# Patient Record
Sex: MALE | Race: WHITE | NOT HISPANIC OR LATINO | Employment: OTHER | URBAN - METROPOLITAN AREA
[De-identification: names, ages, dates, MRNs, and addresses within clinical notes are randomized per-mention and may not be internally consistent; named-entity substitution may affect disease eponyms.]

---

## 2023-02-22 LAB
ALANINE AMINOTRANSFERASE (SGPT) (U/L) IN SER/PLAS: 97 U/L (ref 10–52)
ALBUMIN (G/DL) IN SER/PLAS: 4.1 G/DL (ref 3.4–5)
ALKALINE PHOSPHATASE (U/L) IN SER/PLAS: 95 U/L (ref 33–120)
ASPARTATE AMINOTRANSFERASE (SGOT) (U/L) IN SER/PLAS: 43 U/L (ref 9–39)
BASOPHILS (10*3/UL) IN BLOOD BY AUTOMATED COUNT: 0.05 X10E9/L (ref 0–0.1)
BASOPHILS/100 LEUKOCYTES IN BLOOD BY AUTOMATED COUNT: 0.5 % (ref 0–2)
BILIRUBIN DIRECT (MG/DL) IN SER/PLAS: 0.1 MG/DL (ref 0–0.3)
BILIRUBIN TOTAL (MG/DL) IN SER/PLAS: 0.6 MG/DL (ref 0–1.2)
EOSINOPHILS (10*3/UL) IN BLOOD BY AUTOMATED COUNT: 0.45 X10E9/L (ref 0–0.7)
EOSINOPHILS/100 LEUKOCYTES IN BLOOD BY AUTOMATED COUNT: 4.6 % (ref 0–6)
ERYTHROCYTE DISTRIBUTION WIDTH (RATIO) BY AUTOMATED COUNT: 12.2 % (ref 11.5–14.5)
ERYTHROCYTE MEAN CORPUSCULAR HEMOGLOBIN CONCENTRATION (G/DL) BY AUTOMATED: 32.1 G/DL (ref 32–36)
ERYTHROCYTE MEAN CORPUSCULAR VOLUME (FL) BY AUTOMATED COUNT: 88 FL (ref 80–100)
ERYTHROCYTES (10*6/UL) IN BLOOD BY AUTOMATED COUNT: 5.44 X10E12/L (ref 4.5–5.9)
HEMATOCRIT (%) IN BLOOD BY AUTOMATED COUNT: 47.7 % (ref 41–52)
HEMOGLOBIN (G/DL) IN BLOOD: 15.3 G/DL (ref 13.5–17.5)
IMMATURE GRANULOCYTES/100 LEUKOCYTES IN BLOOD BY AUTOMATED COUNT: 0.3 % (ref 0–0.9)
LEUKOCYTES (10*3/UL) IN BLOOD BY AUTOMATED COUNT: 9.8 X10E9/L (ref 4.4–11.3)
LYMPHOCYTES (10*3/UL) IN BLOOD BY AUTOMATED COUNT: 3.26 X10E9/L (ref 1.2–4.8)
LYMPHOCYTES/100 LEUKOCYTES IN BLOOD BY AUTOMATED COUNT: 33.2 % (ref 13–44)
MONOCYTES (10*3/UL) IN BLOOD BY AUTOMATED COUNT: 1.06 X10E9/L (ref 0.1–1)
MONOCYTES/100 LEUKOCYTES IN BLOOD BY AUTOMATED COUNT: 10.8 % (ref 2–10)
NEUTROPHILS (10*3/UL) IN BLOOD BY AUTOMATED COUNT: 4.98 X10E9/L (ref 1.2–7.7)
NEUTROPHILS/100 LEUKOCYTES IN BLOOD BY AUTOMATED COUNT: 50.6 % (ref 40–80)
NRBC (PER 100 WBCS) BY AUTOMATED COUNT: 0 /100 WBC (ref 0–0)
PLATELETS (10*3/UL) IN BLOOD AUTOMATED COUNT: 288 X10E9/L (ref 150–450)
PROTEIN TOTAL: 7.1 G/DL (ref 6.4–8.2)

## 2023-02-27 LAB
6-ACETYLMORPHINE: <25 NG/ML
7-AMINOCLONAZEPAM: 78 NG/ML
ALPHA-HYDROXYALPRAZOLAM: <25 NG/ML
ALPHA-HYDROXYMIDAZOLAM: <25 NG/ML
ALPRAZOLAM: <25 NG/ML
AMPHETAMINE (PRESENCE) IN URINE BY SCREEN METHOD: ABNORMAL
BARBITURATES PRESENCE IN URINE BY SCREEN METHOD: ABNORMAL
CANNABINOIDS IN URINE BY SCREEN METHOD: ABNORMAL
CHLORDIAZEPOXIDE: <25 NG/ML
CLONAZEPAM: <25 NG/ML
COCAINE (PRESENCE) IN URINE BY SCREEN METHOD: ABNORMAL
CODEINE: <50 NG/ML
CREATINE, URINE FOR DRUG: 136.2 MG/DL
DIAZEPAM: <25 NG/ML
DRUG SCREEN COMMENT URINE: ABNORMAL
EDDP: <25 NG/ML
FENTANYL CONFIRMATION, URINE: <2.5 NG/ML
HYDROCODONE: <25 NG/ML
HYDROMORPHONE: <25 NG/ML
LORAZEPAM: <25 NG/ML
METHADONE CONFIRMATION,URINE: <25 NG/ML
MIDAZOLAM: <25 NG/ML
MORPHINE URINE: <50 NG/ML
NORDIAZEPAM: <25 NG/ML
NORFENTANYL: <2.5 NG/ML
NORHYDROCODONE: <25 NG/ML
NOROXYCODONE: <25 NG/ML
O-DESMETHYLTRAMADOL: <50 NG/ML
OXAZEPAM: <25 NG/ML
OXYCODONE: <25 NG/ML
OXYMORPHONE: <25 NG/ML
PHENCYCLIDINE (PRESENCE) IN URINE BY SCREEN METHOD: ABNORMAL
TEMAZEPAM: <25 NG/ML
TRAMADOL: <50 NG/ML
ZOLPIDEM METABOLITE (ZCA): <25 NG/ML
ZOLPIDEM: <25 NG/ML

## 2023-04-26 ENCOUNTER — OFFICE VISIT (OUTPATIENT)
Dept: PRIMARY CARE | Facility: CLINIC | Age: 23
End: 2023-04-26
Payer: COMMERCIAL

## 2023-04-26 VITALS — SYSTOLIC BLOOD PRESSURE: 122 MMHG | WEIGHT: 315 LBS | BODY MASS INDEX: 49.23 KG/M2 | DIASTOLIC BLOOD PRESSURE: 88 MMHG

## 2023-04-26 DIAGNOSIS — L01.02 FOLLICULITIS AND PERIFOLLICULITIS: ICD-10-CM

## 2023-04-26 DIAGNOSIS — Z00.00 HEALTHCARE MAINTENANCE: Primary | ICD-10-CM

## 2023-04-26 DIAGNOSIS — L73.9 FOLLICULITIS AND PERIFOLLICULITIS: ICD-10-CM

## 2023-04-26 PROBLEM — G40.309 GENERALIZED EPILEPSY (MULTI): Status: ACTIVE | Noted: 2023-04-26

## 2023-04-26 PROBLEM — F91.9 DISRUPTIVE BEHAVIOR DISORDER: Status: ACTIVE | Noted: 2023-04-26

## 2023-04-26 PROBLEM — F63.9 IMPULSE CONTROL DISORDER: Status: ACTIVE | Noted: 2023-04-26

## 2023-04-26 PROBLEM — M40.209 KYPHOSIS: Status: ACTIVE | Noted: 2023-04-26

## 2023-04-26 PROBLEM — R51.9 HEADACHE: Status: ACTIVE | Noted: 2023-04-26

## 2023-04-26 PROBLEM — F84.0 AUTISM DISORDER (HHS-HCC): Status: ACTIVE | Noted: 2023-04-26

## 2023-04-26 PROBLEM — M41.9 SCOLIOSIS: Status: ACTIVE | Noted: 2023-04-26

## 2023-04-26 PROBLEM — L21.9 SEBORRHEIC DERMATITIS: Status: ACTIVE | Noted: 2023-04-26

## 2023-04-26 PROBLEM — R74.01 ELEVATED ALT MEASUREMENT: Status: ACTIVE | Noted: 2023-04-26

## 2023-04-26 PROBLEM — M54.6 PAIN IN THORACIC SPINE: Status: ACTIVE | Noted: 2023-04-26

## 2023-04-26 PROBLEM — M25.511 RIGHT SHOULDER PAIN: Status: ACTIVE | Noted: 2023-04-26

## 2023-04-26 PROCEDURE — 99214 OFFICE O/P EST MOD 30 MIN: CPT | Performed by: INTERNAL MEDICINE

## 2023-04-26 PROCEDURE — 1036F TOBACCO NON-USER: CPT | Performed by: INTERNAL MEDICINE

## 2023-04-26 RX ORDER — ZIPRASIDONE HYDROCHLORIDE 60 MG/1
CAPSULE ORAL
COMMUNITY
Start: 2023-04-24 | End: 2023-10-11 | Stop reason: SDUPTHER

## 2023-04-26 RX ORDER — CLONIDINE HYDROCHLORIDE 0.2 MG/1
TABLET ORAL
COMMUNITY
End: 2023-10-11 | Stop reason: SDUPTHER

## 2023-04-26 RX ORDER — CLONAZEPAM 2 MG/1
TABLET, ORALLY DISINTEGRATING ORAL
COMMUNITY
Start: 2017-01-06

## 2023-04-26 RX ORDER — RISPERIDONE 2 MG/1
1 TABLET ORAL 2 TIMES DAILY
COMMUNITY
Start: 2017-08-30 | End: 2023-10-11 | Stop reason: WASHOUT

## 2023-04-26 RX ORDER — FLUTICASONE PROPIONATE 50 MCG
2 SPRAY, SUSPENSION (ML) NASAL DAILY
COMMUNITY

## 2023-04-26 RX ORDER — HYDROXYZINE HYDROCHLORIDE 25 MG/1
TABLET, FILM COATED ORAL
COMMUNITY
Start: 2019-06-14 | End: 2023-10-11 | Stop reason: SDUPTHER

## 2023-04-26 RX ORDER — PROPRANOLOL HYDROCHLORIDE 60 MG/1
TABLET ORAL
COMMUNITY
Start: 2022-05-18 | End: 2023-10-11 | Stop reason: SDUPTHER

## 2023-04-26 RX ORDER — MULTIVITAMIN
1 TABLET ORAL DAILY
COMMUNITY

## 2023-04-26 RX ORDER — CLONAZEPAM 1 MG/1
TABLET ORAL
COMMUNITY
End: 2023-10-11 | Stop reason: SDUPTHER

## 2023-04-26 RX ORDER — KETOCONAZOLE 20 MG/G
CREAM TOPICAL 2 TIMES DAILY
COMMUNITY
Start: 2022-04-27 | End: 2023-10-25 | Stop reason: SDUPTHER

## 2023-04-26 RX ORDER — LAMOTRIGINE 25 MG/1
TABLET, CHEWABLE ORAL
COMMUNITY
End: 2023-12-06

## 2023-04-26 RX ORDER — CLONIDINE HYDROCHLORIDE 0.1 MG/1
TABLET ORAL
COMMUNITY
Start: 2022-07-06 | End: 2023-10-11 | Stop reason: SDUPTHER

## 2023-04-26 RX ORDER — PROPRANOLOL HYDROCHLORIDE 40 MG/1
TABLET ORAL
COMMUNITY
Start: 2022-06-19 | End: 2023-10-11 | Stop reason: SDUPTHER

## 2023-04-26 RX ORDER — CHLORHEXIDINE GLUCONATE 40 MG/ML
SOLUTION TOPICAL
COMMUNITY
Start: 2022-04-27

## 2023-04-26 NOTE — PROGRESS NOTES
Subjective   Patient ID: Tyler Salinas is a 22 y.o. male who presents for Follow-up.  HPI        Patient presents with mother Chika and  Yosef father    Patient feels well he states I feel happy and silly today no issues the father notes that the shampoo of Hibiclens and ketoconazole is helping the rash on the back of his scalp seems to be improving      Health Maintenance:      Colonoscopy:      Mammogram:      Pelvic/Pap:      Low dose chest CT:      Aorta duplex:      Optho:      Podiatry:        Vaccines:      Prevnar 20:      Prevnar 13:      Pneumovax 23:      Tdap:      Shingrix:      COVID:      Influenza:        ROS:      General: Weight gain likely with risperidone reported denies fever/chills/weight loss      Head: denies HA/trauma/masses/dizziness      Eyes: denies vision change/loss of vision/blurry vision/diplopia/eye pain      Ears: denies hearing loss/tinnitus/otalgia/otorrhea      Nose: denies nasal drainage/anosmia      Throat: denies dysphagia/odynophagia      Lymphatics: denies lymph node swelling      Cardiac: denies CP/palpitations/orthopnea/PND      Pulmonary: denies dyspnea/cough/wheezing      GI: denies abd pain/n/v/diarrhea/melena/hematochezia/hematemesis      : denies dysuria/hematuria/change frequency      Genital: denies genital discharge/lesions      Skin: Posterior scalp rash improving denies rashes/lesions/masses      MSK: denies weakness/swelling/edema/gait imbalance/pain      Neuro: denies paresthesias/seizures/dysarthria      Psych: denies depression/anxiety/suicidal or homicidal ideations            Objective   BP (!) 118/94   Wt 145 kg (319 lb)   BMI 49.23 kg/m²      Physical Exam:     General: AO3, NAD     Head: atraumatic/NC     Eyes: EOMI/PERRLA. Negative APD     Ears: TM pearly gray, EAC clear. No lesions or erythema     Nose: symmetric nares, no discharge     Throat: trachea midline, uvula midline pink mucosa. No thyromegaly     Lymphatics: no  cervical/supraclavicular/ant or posterior cervical adenopathy/axillary/inguinal adenopathy     Breast: not examined     Chest: no deformity or tenderness to palpation     Pulm: CTA b/l, no wheeze/rhonchi/rales. nonlabored     Cardiac: RRR +s1s2, no m/r/g.      GI: soft, NT/ND. Normoactive Bsx4. No rebound/guarding.     Rectal: no examined     MSK: 5/5 strength UE LE. No edema/clubbing/cyanosis     Skin: Posterior scalp mild erythematous macules along the follicle base without tenderness discharge seems to be improving drainage warmth no rashes/lesions     Vascular: 2+ palp DP PT radials b/l. Negative carotid bruit     Neuro: CNII-XII intact. No focal deficits. Reflexes 2/4 brachioradialis bicep tricep patellar achilles. Finger to nose intact.     Psych: appropriate mood/affect                    No results found for: BMPR1A, CBCDIF      Assessment/Plan   Diagnoses and all orders for this visit:  Healthcare maintenance  -     Lipid Panel; Future  -     Hemoglobin A1C; Future  -     T4, free; Future  -     TSH; Future  Folliculitis and perifolliculitis  Comments:  Improving  Continue regimen of Hibiclens with ketoconazole shampoo    As he stated there are plans for possibly coming off risperidone please coordinate with your psychiatrist this may help with the weight gain and cause weight loss    Screening blood work due February 2024    Thank you for making appointment today Tyler    Please follow-up in 6 months         Javy Talbot DO

## 2023-06-01 ENCOUNTER — LAB (OUTPATIENT)
Dept: LAB | Facility: LAB | Age: 23
End: 2023-06-01
Payer: COMMERCIAL

## 2023-06-01 DIAGNOSIS — Z00.00 HEALTHCARE MAINTENANCE: ICD-10-CM

## 2023-06-01 LAB
CHOLESTEROL (MG/DL) IN SER/PLAS: 199 MG/DL (ref 0–199)
CHOLESTEROL IN HDL (MG/DL) IN SER/PLAS: 46.4 MG/DL
CHOLESTEROL/HDL RATIO: 4.3
ESTIMATED AVERAGE GLUCOSE FOR HBA1C: 128 MG/DL
HEMOGLOBIN A1C/HEMOGLOBIN TOTAL IN BLOOD: 6.1 %
LDL: 124 MG/DL (ref 0–119)
THYROTROPIN (MIU/L) IN SER/PLAS BY DETECTION LIMIT <= 0.05 MIU/L: 1.23 MIU/L (ref 0.44–3.98)
THYROXINE (T4) FREE (NG/DL) IN SER/PLAS: 1.34 NG/DL (ref 0.78–1.48)
TRIGLYCERIDE (MG/DL) IN SER/PLAS: 145 MG/DL (ref 0–149)
VLDL: 29 MG/DL (ref 0–40)

## 2023-06-01 PROCEDURE — 83036 HEMOGLOBIN GLYCOSYLATED A1C: CPT

## 2023-06-01 PROCEDURE — 84439 ASSAY OF FREE THYROXINE: CPT

## 2023-06-01 PROCEDURE — 80061 LIPID PANEL: CPT

## 2023-06-01 PROCEDURE — 84443 ASSAY THYROID STIM HORMONE: CPT

## 2023-06-01 PROCEDURE — 36415 COLL VENOUS BLD VENIPUNCTURE: CPT

## 2023-06-09 ENCOUNTER — TELEPHONE (OUTPATIENT)
Dept: PRIMARY CARE | Facility: CLINIC | Age: 23
End: 2023-06-09
Payer: COMMERCIAL

## 2023-06-09 NOTE — TELEPHONE ENCOUNTER
Result Communication    Resulted Orders   Lipid Panel   Result Value Ref Range    Cholesterol 199 0 - 199 mg/dL      Comment:      .      AGE      DESIRABLE   BORDERLINE HIGH   HIGH     0-19 Y     0 - 169       170 - 199     >/= 200    20-24 Y     0 - 189       190 - 224     >/= 225         >24 Y     0 - 199       200 - 239     >/= 240   **All ranges are based on fasting samples. Specific   therapeutic targets will vary based on patient-specific   cardiac risk.  .   Pediatric guidelines reference:Pediatrics 2011, 128(S5).   Adult guidelines reference: NCEP ATPIII Guidelines,     ERIC 2001, 258:2486-97  .   Venipuncture immediately after or during the    administration of Metamizole may lead to falsely   low results. Testing should be performed immediately   prior to Metamizole dosing.    HDL 46.4 mg/dL      Comment:      .      AGE      VERY LOW   LOW     NORMAL    HIGH       0-19 Y       < 35   < 40     40-45     ----    20-24 Y       ----   < 40       >45     ----      >24 Y       ----   < 40     40-60      >60  .    Cholesterol/HDL Ratio 4.3       Comment:      REF VALUES  DESIRABLE  < 3.4  HIGH RISK  > 5.0     (H) 0 - 119 mg/dL      Comment:      .                           NEAR      BORD      AGE      DESIRABLE  OPTIMAL    HIGH     HIGH     VERY HIGH     0-19 Y     0 - 109     ---    110-129   >/= 130     ----    20-24 Y     0 - 119     ---    120-159   >/= 160     ----      >24 Y     0 -  99   100-129  130-159   160-189     >/=190  .    VLDL 29 0 - 40 mg/dL    Triglycerides 145 0 - 149 mg/dL      Comment:      .      AGE      DESIRABLE   BORDERLINE HIGH   HIGH     VERY HIGH   0 D-90 D    19 - 174         ----         ----        ----  91 D- 9 Y     0 -  74        75 -  99     >/= 100      ----    10-19 Y     0 -  89        90 - 129     >/= 130      ----    20-24 Y     0 - 114       115 - 149     >/= 150      ----         >24 Y     0 - 149       150 - 199    200- 499    >/= 500  .   Venipuncture  immediately after or during the    administration of Metamizole may lead to falsely   low results. Testing should be performed immediately   prior to Metamizole dosing.   Hemoglobin A1C   Result Value Ref Range    Hemoglobin A1C 6.1 (A) %      Comment:           Diagnosis of Diabetes-Adults   Non-Diabetic: < or = 5.6%   Increased risk for developing diabetes: 5.7-6.4%   Diagnostic of diabetes: > or = 6.5%  .       Monitoring of Diabetes                Age (y)     Therapeutic Goal (%)   Adults:          >18           <7.0   Pediatrics:    13-18           <7.5                   7-12           <8.0                   0- 6            7.5-8.5   American Diabetes Association. Diabetes Care 33(S1), Jan 2010.    Estimated Average Glucose 128 MG/DL   T4, free   Result Value Ref Range    Free T4 1.34 0.78 - 1.48 ng/dL      Comment:       Thyroxine Free testing is performed using different testing    methodology at [facility] than at other    [facility]. Direct result comparisons should    only be made within the same method.   TSH   Result Value Ref Range    TSH 1.23 0.44 - 3.98 mIU/L      Comment:       TSH testing is performed using different testing    methodology at [facility] than at other    [facility]. Direct result comparisons should    only be made within the same method.       4:05 PM      Called patient back no answer left voicemail advised increased risk of diabetes with an A1c of 6.1 borderline high cholesterol 199 still in the normal range should increase exercise 30 minutes a day low-cholesterol diet otherwise currently available blood work is stable normal if any further questions or would like an in office result review please schedule follow-up in the next 2 to 4 weeks thank you

## 2023-10-11 ENCOUNTER — TELEMEDICINE (OUTPATIENT)
Dept: BEHAVIORAL HEALTH | Facility: CLINIC | Age: 23
End: 2023-10-11
Payer: COMMERCIAL

## 2023-10-11 DIAGNOSIS — F84.0 AUTISM DISORDER (HHS-HCC): ICD-10-CM

## 2023-10-11 DIAGNOSIS — F91.9 DISRUPTIVE BEHAVIOR DISORDER: ICD-10-CM

## 2023-10-11 DIAGNOSIS — F63.9 IMPULSE CONTROL DISORDER: Primary | ICD-10-CM

## 2023-10-11 DIAGNOSIS — R73.01 IMPAIRED FASTING BLOOD SUGAR: ICD-10-CM

## 2023-10-11 PROBLEM — M54.6 PAIN IN THORACIC SPINE: Status: RESOLVED | Noted: 2023-04-26 | Resolved: 2023-10-11

## 2023-10-11 PROCEDURE — 99214 OFFICE O/P EST MOD 30 MIN: CPT | Performed by: PSYCHIATRY & NEUROLOGY

## 2023-10-11 RX ORDER — CLONAZEPAM 1 MG/1
TABLET ORAL
Qty: 30 TABLET | Refills: 0 | Status: SHIPPED | OUTPATIENT
Start: 2023-10-11 | End: 2023-12-20 | Stop reason: SDUPTHER

## 2023-10-11 RX ORDER — CLONIDINE HYDROCHLORIDE 0.1 MG/1
TABLET ORAL
Qty: 60 TABLET | Refills: 0 | Status: SHIPPED | OUTPATIENT
Start: 2023-10-11 | End: 2023-12-05

## 2023-10-11 RX ORDER — ZIPRASIDONE HYDROCHLORIDE 60 MG/1
CAPSULE ORAL
Qty: 30 CAPSULE | Refills: 0 | Status: SHIPPED | OUTPATIENT
Start: 2023-10-11 | End: 2023-12-05

## 2023-10-11 RX ORDER — CLONIDINE HYDROCHLORIDE 0.2 MG/1
0.2 TABLET ORAL NIGHTLY
Qty: 30 TABLET | Refills: 0 | Status: SHIPPED | OUTPATIENT
Start: 2023-10-11 | End: 2023-12-20 | Stop reason: SDUPTHER

## 2023-10-11 RX ORDER — HYDROXYZINE HYDROCHLORIDE 25 MG/1
TABLET, FILM COATED ORAL
Qty: 90 TABLET | Refills: 0 | Status: SHIPPED | OUTPATIENT
Start: 2023-10-11 | End: 2023-12-20 | Stop reason: SDUPTHER

## 2023-10-11 RX ORDER — ZIPRASIDONE HYDROCHLORIDE 40 MG/1
CAPSULE ORAL
Qty: 60 CAPSULE | Refills: 0 | Status: SHIPPED | OUTPATIENT
Start: 2023-10-11 | End: 2023-12-05

## 2023-10-11 RX ORDER — PROPRANOLOL HYDROCHLORIDE 60 MG/1
TABLET ORAL
Qty: 60 TABLET | Refills: 0 | Status: SHIPPED | OUTPATIENT
Start: 2023-10-11 | End: 2023-12-20 | Stop reason: SDUPTHER

## 2023-10-11 ASSESSMENT — ENCOUNTER SYMPTOMS
MYALGIAS: 0
CONSTIPATION: 0
VOMITING: 0
TROUBLE SWALLOWING: 0
ABDOMINAL PAIN: 0
SHORTNESS OF BREATH: 0
SEIZURES: 0
SLEEP DISTURBANCE: 0
TREMORS: 0

## 2023-10-11 NOTE — PROGRESS NOTES
A HIPAA-compliant interactive audio and video telecommunication system which permits real time communications between the patient (at the originating site) and provider (at the distant site) was utilized to provide this telehealth service.     The patient, family, caregivers and guardian (as appropriate) have provided consent on this date to conduct treatment via this telehealth service.  The patient's identity and physical location were verified at the time of this visit.      Present for appointment: Tyler and mom/guardian (Chika).    SUBJECTIVE    Last visit was 8/09/2023: No treatment changes made at that time.  Not attending day program right now; having difficult time finding a program (and transportation) that can manage some of his behavioral issues.  He has an independent provider/caregiver (Sanam) that works well with him.  He's been a bit more irritable/agitated over the past few days as dad has been ill and is in the hospital.  Generally sleeping OK; usually naps for about an hour in the morning before caregiver arrives.  Weight might be down a bit over the past few months since coming off risperidone.  He does not appear to be having any EPS or TD.     Last visit with neurology (Dr. Asaf Garrison at ) was 8/31/2023 - no treatment changes made at that time.  Last known seizure was April 2020.  Current AED regimen remains lamotrigine 225mg BID.     Review of Systems   HENT:  Negative for drooling and trouble swallowing.    Respiratory:  Negative for shortness of breath.    Cardiovascular:  Negative for chest pain.   Gastrointestinal:  Negative for abdominal pain, constipation and vomiting.   Genitourinary:  Negative for enuresis.   Musculoskeletal:  Negative for gait problem and myalgias.   Neurological:  Negative for tremors, seizures and syncope.   Psychiatric/Behavioral:  Negative for sleep disturbance.       Controlled Substance Evaluation  [place]ARRS/PDMP reviewed: Faisal Mcadams MD on  10/11/2023 10:33 AM  Is the patient prescribed a combination of a benzodiazepine and opioid? No  I have personally reviewed the OARRS report for Tyler Salinas.   I have considered the risks of abuse, dependence, addiction and diversion.    I believe that it is clinically appropriate for Tyler Salinas to be prescribed this medication.    Last Urine Drug Screen: 02/22/2023  Results as expected? Yes    Controlled Substance Agreement: 01/25/2023  Reviewed Controlled Substance Agreement, including but not limited to:  Benefits, risks, and alternatives to treatment with a controlled substance medication(s).    Prescribed Controlled Substances: Clonazepam  What is the patient's goal of therapy? Agitation, sleep, seizures  Is this being achieved with current treatment? Yes  Activities of Daily Living:   Is your overall impression that this patient is benefiting from therapy? Yes  1. Physical Functioning: Same  2. Family Relationship: Same  3. Social Relationship: Same  4. Mood: Same  5. Sleep Patterns: Same  6. Overall Function: Same     MEDICATION HISTORY  Aripiprazole  Fluoxetine  Quetiapine  Risperidone    SOCIAL HISTORY  Living situation Lives with parents   Provider agency N/A   Work or day program None currently   School [employer] - graduated summer 2021   Guardianship Mother (Chika)   SSA ?   Bx Specialist ?   Nicotine None   Alcohol None   Other drugs None     OBJECTIVE    Lab Results   Component Value Date    HGB 15.3 02/22/2023     02/22/2023    NEUTROABS 4.98 02/22/2023    GLUCOSE 102 (H) 05/12/2022     05/12/2022    K 4.6 05/12/2022    CO2 27 05/12/2022    CALCIUM 10.0 05/12/2022    CREATININE 0.94 05/12/2022    AST 43 (H) 02/22/2023    ALT 97 (H) 02/22/2023    HGBA1C 6.1 (A) 06/01/2023    TSH 1.23 06/01/2023    FREET4 1.34 06/01/2023    CHOL 199 06/01/2023    LDLF 124 (H) 06/01/2023    TRIG 145 06/01/2023    VITD25 30 01/29/2020     Therapeutic Drug Monitoring  08/19/2020: Lamotrigine level =  5.0 [2.5-15] (450mg/day)    Electrocardiograms  04/22/2019: Sinus tachycardia, , QTc 432    MENTAL STATUS EXAM  General/Appearance: Appropriate dress/hygiene/grooming.  Attitude/Behavior: Difficult to engage. Poor/limited eye contact. Superficially cooperative.  Speech/Communication: Echolalia. Scripting. Single word responses.  Motor: No PMA. No abnormal or involuntary movements observed.  Gait: Not assessed at this visit.  Mood: Neutral.  Affect: Neutral.  Thought processes: [place]. Goal-directed.  Thought content: Unable to assess.  Perception: Does not appear to be experiencing or responding to hallucinations.  Sensorium/Cognition: Oriented to self and surroundings. Intellectual impairment.  Memory: Recent & remote recall is intact.  Insight: Minimal.  Judgment: Impulsive.    ASSESSMENT  Will continue current treatments for now and meet with Tyler and family again in a few weeks when dad is able to be present.  We can re-check an A1c now that he's been off the risperidone for about 6 months (last A1c was 4 months ago).    PROBLEM LIST  1. ASD   2. Impulse control disorder  3. Disruptive behaviors  4. Epilepsy    PLAN  -- Continue ziprasidone 60mg - 40mg - 40mg (AM, 14:00, 19:00) for aggression and impulse control  -- Continue propranolol 60mg BID (AM and 14:00) for impulse control  -- Continue clonidine 0.1mg - 0.1mg - 0.2mg (AM, 14:00, 19:00) for impulse control  -- Continue hydroxyzine 25mg at 14:00 and 50mg at 19:00 for anxiety  -- Continue clonazepam 0.25mg QHS for anxiety and sleep  -- Follow up 2 months

## 2023-10-19 ENCOUNTER — LAB (OUTPATIENT)
Dept: LAB | Facility: LAB | Age: 23
End: 2023-10-19
Payer: COMMERCIAL

## 2023-10-19 DIAGNOSIS — R73.01 IMPAIRED FASTING BLOOD SUGAR: ICD-10-CM

## 2023-10-19 DIAGNOSIS — G40.309 GENERALIZED IDIOPATHIC EPILEPSY AND EPILEPTIC SYNDROMES, NOT INTRACTABLE, WITHOUT STATUS EPILEPTICUS (MULTI): Primary | ICD-10-CM

## 2023-10-19 LAB
ALBUMIN SERPL BCP-MCNC: 4.1 G/DL (ref 3.4–5)
ALP SERPL-CCNC: 82 U/L (ref 33–120)
ALT SERPL W P-5'-P-CCNC: 84 U/L (ref 10–52)
AST SERPL W P-5'-P-CCNC: 40 U/L (ref 9–39)
BASOPHILS # BLD AUTO: 0.07 X10*3/UL (ref 0–0.1)
BASOPHILS NFR BLD AUTO: 0.6 %
BILIRUB DIRECT SERPL-MCNC: 0.1 MG/DL (ref 0–0.3)
BILIRUB SERPL-MCNC: 0.7 MG/DL (ref 0–1.2)
EOSINOPHIL # BLD AUTO: 0.36 X10*3/UL (ref 0–0.7)
EOSINOPHIL NFR BLD AUTO: 3.2 %
ERYTHROCYTE [DISTWIDTH] IN BLOOD BY AUTOMATED COUNT: 12.4 % (ref 11.5–14.5)
EST. AVERAGE GLUCOSE BLD GHB EST-MCNC: 126 MG/DL
HBA1C MFR BLD: 6 %
HCT VFR BLD AUTO: 52.1 % (ref 41–52)
HGB BLD-MCNC: 16.4 G/DL (ref 13.5–17.5)
IMM GRANULOCYTES # BLD AUTO: 0.04 X10*3/UL (ref 0–0.7)
IMM GRANULOCYTES NFR BLD AUTO: 0.4 % (ref 0–0.9)
LAMOTRIGINE SERPL-MCNC: 9.2 UG/ML (ref 2.5–15)
LYMPHOCYTES # BLD AUTO: 3.89 X10*3/UL (ref 1.2–4.8)
LYMPHOCYTES NFR BLD AUTO: 34.7 %
MCH RBC QN AUTO: 28.5 PG (ref 26–34)
MCHC RBC AUTO-ENTMCNC: 31.5 G/DL (ref 32–36)
MCV RBC AUTO: 91 FL (ref 80–100)
MONOCYTES # BLD AUTO: 1.17 X10*3/UL (ref 0.1–1)
MONOCYTES NFR BLD AUTO: 10.4 %
NEUTROPHILS # BLD AUTO: 5.67 X10*3/UL (ref 1.2–7.7)
NEUTROPHILS NFR BLD AUTO: 50.7 %
NRBC BLD-RTO: 0 /100 WBCS (ref 0–0)
PLATELET # BLD AUTO: 323 X10*3/UL (ref 150–450)
PMV BLD AUTO: 10.3 FL (ref 7.5–11.5)
PROT SERPL-MCNC: 7.3 G/DL (ref 6.4–8.2)
RBC # BLD AUTO: 5.76 X10*6/UL (ref 4.5–5.9)
WBC # BLD AUTO: 11.2 X10*3/UL (ref 4.4–11.3)

## 2023-10-19 PROCEDURE — 85025 COMPLETE CBC W/AUTO DIFF WBC: CPT

## 2023-10-19 PROCEDURE — 83036 HEMOGLOBIN GLYCOSYLATED A1C: CPT

## 2023-10-19 PROCEDURE — 80076 HEPATIC FUNCTION PANEL: CPT

## 2023-10-19 PROCEDURE — 80175 DRUG SCREEN QUAN LAMOTRIGINE: CPT

## 2023-10-19 PROCEDURE — 36415 COLL VENOUS BLD VENIPUNCTURE: CPT

## 2023-10-25 ENCOUNTER — OFFICE VISIT (OUTPATIENT)
Dept: PRIMARY CARE | Facility: CLINIC | Age: 23
End: 2023-10-25
Payer: COMMERCIAL

## 2023-10-25 VITALS
HEART RATE: 79 BPM | OXYGEN SATURATION: 95 % | DIASTOLIC BLOOD PRESSURE: 78 MMHG | BODY MASS INDEX: 45.31 KG/M2 | WEIGHT: 299 LBS | SYSTOLIC BLOOD PRESSURE: 112 MMHG | HEIGHT: 68 IN

## 2023-10-25 DIAGNOSIS — Z00.00 HEALTHCARE MAINTENANCE: Primary | ICD-10-CM

## 2023-10-25 DIAGNOSIS — L01.02 FOLLICULITIS AND PERIFOLLICULITIS: ICD-10-CM

## 2023-10-25 DIAGNOSIS — L73.9 FOLLICULITIS AND PERIFOLLICULITIS: ICD-10-CM

## 2023-10-25 DIAGNOSIS — Z23 NEED FOR INFLUENZA VACCINATION: ICD-10-CM

## 2023-10-25 DIAGNOSIS — G40.309 GENERALIZED EPILEPSY (MULTI): ICD-10-CM

## 2023-10-25 PROCEDURE — 99213 OFFICE O/P EST LOW 20 MIN: CPT | Performed by: INTERNAL MEDICINE

## 2023-10-25 PROCEDURE — 90686 IIV4 VACC NO PRSV 0.5 ML IM: CPT | Performed by: INTERNAL MEDICINE

## 2023-10-25 PROCEDURE — 90471 IMMUNIZATION ADMIN: CPT | Performed by: INTERNAL MEDICINE

## 2023-10-25 PROCEDURE — 1036F TOBACCO NON-USER: CPT | Performed by: INTERNAL MEDICINE

## 2023-10-25 RX ORDER — KETOCONAZOLE 20 MG/G
CREAM TOPICAL 2 TIMES DAILY
Qty: 60 G | Refills: 2 | Status: SHIPPED | OUTPATIENT
Start: 2023-10-25

## 2023-10-25 ASSESSMENT — PATIENT HEALTH QUESTIONNAIRE - PHQ9
SUM OF ALL RESPONSES TO PHQ9 QUESTIONS 1 AND 2: 0
2. FEELING DOWN, DEPRESSED OR HOPELESS: NOT AT ALL
1. LITTLE INTEREST OR PLEASURE IN DOING THINGS: NOT AT ALL
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
## 2023-12-05 DIAGNOSIS — F63.9 IMPULSE CONTROL DISORDER: ICD-10-CM

## 2023-12-05 DIAGNOSIS — F91.9 DISRUPTIVE BEHAVIOR DISORDER: ICD-10-CM

## 2023-12-05 RX ORDER — CLONIDINE HYDROCHLORIDE 0.1 MG/1
TABLET ORAL
Qty: 60 TABLET | Refills: 0 | Status: SHIPPED | OUTPATIENT
Start: 2023-12-05 | End: 2023-12-20 | Stop reason: SDUPTHER

## 2023-12-05 RX ORDER — ZIPRASIDONE HYDROCHLORIDE 60 MG/1
CAPSULE ORAL
Qty: 30 CAPSULE | Refills: 0 | Status: SHIPPED | OUTPATIENT
Start: 2023-12-05 | End: 2023-12-20 | Stop reason: SDUPTHER

## 2023-12-05 RX ORDER — ZIPRASIDONE HYDROCHLORIDE 40 MG/1
CAPSULE ORAL
Qty: 60 CAPSULE | Refills: 0 | Status: SHIPPED | OUTPATIENT
Start: 2023-12-05 | End: 2023-12-20 | Stop reason: SDUPTHER

## 2023-12-20 ENCOUNTER — OFFICE VISIT (OUTPATIENT)
Dept: BEHAVIORAL HEALTH | Facility: CLINIC | Age: 23
End: 2023-12-20
Payer: COMMERCIAL

## 2023-12-20 DIAGNOSIS — F84.0 AUTISM DISORDER (HHS-HCC): ICD-10-CM

## 2023-12-20 DIAGNOSIS — F84.0 AUTISM SPECTRUM DISORDER (HHS-HCC): ICD-10-CM

## 2023-12-20 DIAGNOSIS — F91.9 DISRUPTIVE BEHAVIOR DISORDER: Primary | ICD-10-CM

## 2023-12-20 DIAGNOSIS — G40.309 GENERALIZED EPILEPSY (MULTI): ICD-10-CM

## 2023-12-20 DIAGNOSIS — F63.9 IMPULSE CONTROL DISORDER: ICD-10-CM

## 2023-12-20 PROBLEM — R51.9 HEADACHE: Status: RESOLVED | Noted: 2023-04-26 | Resolved: 2023-12-20

## 2023-12-20 PROCEDURE — 1036F TOBACCO NON-USER: CPT | Performed by: PSYCHIATRY & NEUROLOGY

## 2023-12-20 PROCEDURE — 99214 OFFICE O/P EST MOD 30 MIN: CPT | Performed by: PSYCHIATRY & NEUROLOGY

## 2023-12-20 RX ORDER — ZIPRASIDONE HYDROCHLORIDE 60 MG/1
CAPSULE ORAL
Qty: 30 CAPSULE | Refills: 1 | Status: SHIPPED | OUTPATIENT
Start: 2023-12-20 | End: 2024-02-21 | Stop reason: SDUPTHER

## 2023-12-20 RX ORDER — ZIPRASIDONE HYDROCHLORIDE 40 MG/1
CAPSULE ORAL
Qty: 60 CAPSULE | Refills: 1 | Status: SHIPPED | OUTPATIENT
Start: 2023-12-20 | End: 2024-02-21 | Stop reason: SDUPTHER

## 2023-12-20 RX ORDER — PROPRANOLOL HYDROCHLORIDE 60 MG/1
TABLET ORAL
Qty: 60 TABLET | Refills: 1 | Status: SHIPPED | OUTPATIENT
Start: 2023-12-20 | End: 2024-02-21 | Stop reason: SDUPTHER

## 2023-12-20 RX ORDER — CLONIDINE HYDROCHLORIDE 0.1 MG/1
TABLET ORAL
Qty: 60 TABLET | Refills: 1 | Status: SHIPPED | OUTPATIENT
Start: 2023-12-20 | End: 2024-02-21 | Stop reason: SDUPTHER

## 2023-12-20 RX ORDER — CLONAZEPAM 1 MG/1
TABLET ORAL
Qty: 30 TABLET | Refills: 0 | Status: SHIPPED | OUTPATIENT
Start: 2023-12-20 | End: 2024-02-21 | Stop reason: SDUPTHER

## 2023-12-20 RX ORDER — CLONIDINE HYDROCHLORIDE 0.2 MG/1
0.2 TABLET ORAL NIGHTLY
Qty: 30 TABLET | Refills: 1 | Status: SHIPPED | OUTPATIENT
Start: 2023-12-20 | End: 2024-02-21 | Stop reason: SDUPTHER

## 2023-12-20 RX ORDER — HYDROXYZINE HYDROCHLORIDE 25 MG/1
TABLET, FILM COATED ORAL
Qty: 90 TABLET | Refills: 1 | Status: SHIPPED | OUTPATIENT
Start: 2023-12-20 | End: 2024-02-21 | Stop reason: SDUPTHER

## 2023-12-20 ASSESSMENT — ENCOUNTER SYMPTOMS
VOMITING: 0
SLEEP DISTURBANCE: 0
SHORTNESS OF BREATH: 0
TREMORS: 0
ABDOMINAL PAIN: 0
CONSTIPATION: 0
TROUBLE SWALLOWING: 0
SEIZURES: 0
MYALGIAS: 0

## 2023-12-20 ASSESSMENT — LIFESTYLE VARIABLES
HOW OFTEN DO YOU HAVE SIX OR MORE DRINKS ON ONE OCCASION: NEVER
AUDIT-C TOTAL SCORE: 0
SKIP TO QUESTIONS 9-10: 1
HOW MANY STANDARD DRINKS CONTAINING ALCOHOL DO YOU HAVE ON A TYPICAL DAY: PATIENT DOES NOT DRINK
HOW OFTEN DO YOU HAVE A DRINK CONTAINING ALCOHOL: NEVER

## 2023-12-20 NOTE — PROGRESS NOTES
"Outpatient Psychiatry    A HIPAA-compliant interactive audio and video telecommunication system which permits real time communications between the patient (at the originating site) and provider (at the distant site) was utilized to provide this telehealth service.     The patient, family, caregivers and guardian (as appropriate) have provided consent on this date to conduct treatment via this telehealth service.  The patient's identity and physical location were verified at the time of this visit.      Present for appointment: Tyler and parents (Yosef and Chika).    SUBJECTIVE    Tyler has generally been doing OK over the past few months.  Lately he's been very \"amped up\" and excited about [facility], with a noticeable increase in perseverative questions.  Parents are thinking they might re-explore the day program options in the spring of 2024.  He still has an independent provider/caregiver (Sanam) that works well with him.  He and dad have one weekend day together a month where they go out and do things that Tyler likes (their \"adventure\" day).  Still having some instances of being awake overnight and often naps in the early part of the day (though this was not the case previously when he had a more structured daytime routine like school or day program).  Weight might be incrementally decreasing.  No other GI problems noted.  He does not appear to be having any EPS or TD.  He has not had any seizures in the interim.  Current ASM regimen remains lamotrigine 225mg twice daily.     Review of Systems   HENT:  Negative for drooling and trouble swallowing.    Respiratory:  Negative for shortness of breath.    Cardiovascular:  Negative for chest pain.   Gastrointestinal:  Negative for abdominal pain, constipation and vomiting.   Genitourinary:  Negative for enuresis.   Musculoskeletal:  Negative for gait problem and myalgias.   Neurological:  Negative for tremors, seizures and syncope.   Psychiatric/Behavioral:  Negative " for sleep disturbance.       Controlled Substance Evaluation  OARRS/PDMP reviewed: Faisal Mcadams MD on 12/20/2023  6:49 AM  Is the patient prescribed a combination of a benzodiazepine and opioid? No  I have personally reviewed the OARRS report for Tyler Salinas.   I have considered the risks of abuse, dependence, addiction and diversion.    I believe that it is clinically appropriate for Tyler Salinas to be prescribed this medication.    Last Urine Drug Screen:  Recent Results (from the past 8760 hour(s))   OPIATE/OPIOID/BENZO PRESCRIPTION COMPLIANCE    Collection Time: 02/22/23 11:46 AM   Result Value Ref Range    DRUG SCREEN COMMENT URINE SEE BELOW     Creatine, Urine 136.2 mg/dL    Amphetamine Screen, Urine PRESUMPTIVE NEGATIVE NEGATIVE    Barbiturate Screen, Urine PRESUMPTIVE NEGATIVE NEGATIVE    Cannabinoid Screen, Urine PRESUMPTIVE NEGATIVE NEGATIVE    Cocaine Screen, Urine PRESUMPTIVE NEGATIVE NEGATIVE    PCP Screen, Urine PRESUMPTIVE NEGATIVE NEGATIVE    7-Aminoclonazepam 78 (A) Cutoff <25 ng/mL    Alpha-Hydroxyalprazolam <25 Cutoff <25 ng/mL    Alpha-Hydroxymidazolam <25 Cutoff <25 ng/mL    Alprazolam <25 Cutoff <25 ng/mL    Chlordiazepoxide <25 Cutoff <25 ng/mL    Clonazepam <25 Cutoff <25 ng/mL    Diazepam <25 Cutoff <25 ng/mL    Lorazepam <25 Cutoff <25 ng/mL    Midazolam <25 Cutoff <25 ng/mL    Nordiazepam <25 Cutoff <25 ng/mL    Oxazepam <25 Cutoff <25 ng/mL    Temazepam <25 Cutoff <25 ng/mL    Zolpidem <25 Cutoff <25 ng/mL    Zolpidem Metabolite (ZCA) <25 Cutoff <25 ng/mL    6-Acetylmorphine <25 Cutoff <25 ng/mL    Codeine <50 Cutoff <50 ng/mL    Hydrocodone <25 Cutoff <25 ng/mL    Hydromorphone <25 Cutoff <25 ng/mL    Morphine Urine <50 Cutoff <50 ng/mL    Norhydrocodone <25 Cutoff <25 ng/mL    Noroxycodone <25 Cutoff <25 ng/mL    Oxycodone <25 Cutoff <25 ng/mL    Oxymorphone <25 Cutoff <25 ng/mL    Tramadol <50 Cutoff <50 ng/mL    O-Desmethyltramadol <50 Cutoff <50 ng/mL    Fentanyl  <2.5 Cutoff<2.5 ng/mL    Norfentanyl <2.5 Cutoff<2.5 ng/mL    METHADONE CONFIRMATION,URINE <25 Cutoff <25 ng/mL    EDDP <25 Cutoff <25 ng/mL     Results as expected? Yes    Controlled Substance Agreement: 01/25/2023  Reviewed Controlled Substance Agreement, including but not limited to:  Benefits, risks, and alternatives to treatment with a controlled substance medication(s).    Prescribed Controlled Substances:  Benzodiazepines: Clonazepam  What is the patient's goal of therapy? Sleep/agitation  Is this being achieved with current treatment? Somewhat  Activities of Daily Living:   Is your overall impression that this patient is benefiting (symptom reduction outweighs side effects) from benzodiazepine therapy? Yes   1. Physical Functioning: Same  2. Family Relationship: Same  3. Social Relationship: Same  4. Mood: Same  5. Sleep Patterns: Same  6. Overall Function: Same     MEDICATION HISTORY  Aripiprazole  Fluoxetine  Quetiapine  Risperidone    CURRENT MEDICATIONS    Current Outpatient Medications:     cloNIDine (Catapres) 0.1 mg tablet, TAKE 1 TABLET TWICE DAILY (MORNING AND 2PM), Disp: 60 tablet, Rfl: 0    ziprasidone (Geodon) 40 mg capsule, TAKE 1 CAPSULE TWICE DAILY (2PM AND BEDTIME), Disp: 60 capsule, Rfl: 0    ziprasidone (Geodon) 60 mg capsule, TAKE 1 CAPSULE EVERY MORNING, Disp: 30 capsule, Rfl: 0    chlorhexidine (Hibiclens) 4 % external liquid, USE AS DIRECTED. 20 ml mixed into bath soak for 2 hours two times per week, Disp: , Rfl:     clonazePAM (KlonoPIN) 1 mg tablet, 1/4 TO 1/2 TABLET DAILY AS NEEDED, Disp: 30 tablet, Rfl: 0    clonazePAM (KlonoPIN) 2 mg disintegrating tablet, Take by mouth., Disp: , Rfl:     cloNIDine (Catapres) 0.2 mg tablet, Take 1 tablet (0.2 mg) by mouth once daily at bedtime., Disp: 30 tablet, Rfl: 0    fluticasone (Flonase) 50 mcg/actuation nasal spray, Administer 2 sprays into affected nostril(s) once daily., Disp: , Rfl:     hydrOXYzine HCL (Atarax) 25 mg tablet, 1 tablet at  2pm and 2 tablets at bedtime, Disp: 90 tablet, Rfl: 0    ketoconazole (NIZOral) 2 % cream, Apply topically 2 times a day., Disp: 60 g, Rfl: 2    lamoTRIgine (LaMICtal) 25 mg chewable tablet, TAKE 9 CHEWS TWICE DAILY., Disp: 540 tablet, Rfl: 5    multivitamin tablet, Take 1 tablet by mouth once daily., Disp: , Rfl:     propranolol (Inderal) 60 mg tablet, 1 tablet twice daily (morning and 2pm), Disp: 60 tablet, Rfl: 0     SOCIAL HISTORY  Living situation Lives with parents   Provider agency N/A   Work or day program None currently   School [employer] - graduated 2021   Guardianship Mother (Chika)   SSA ?   Bx Specialist ?   Nicotine None   Alcohol None   Other drugs None     OBJECTIVE    Lab Results   Component Value Date    HGB 16.4 10/19/2023     10/19/2023    NEUTROABS 5.67 10/19/2023    GLUCOSE 102 (H) 05/12/2022     05/12/2022    K 4.6 05/12/2022    CO2 27 05/12/2022    CALCIUM 10.0 05/12/2022    CREATININE 0.94 05/12/2022    AST 40 (H) 10/19/2023    ALT 84 (H) 10/19/2023    HGBA1C 6.0 (H) 10/19/2023    TSH 1.23 06/01/2023    FREET4 1.34 06/01/2023    CHOL 199 06/01/2023    LDLF 124 (H) 06/01/2023    TRIG 145 06/01/2023    VITD25 30 01/29/2020     Therapeutic Drug Monitoring  08/19/2020: Lamotrigine level = 5.0 [2.5-15] (450mg/day)    Electrocardiograms  04/22/2019: Sinus tachycardia, , QTc 432    MENTAL STATUS EXAM  General/Appearance: Appropriate dress/hygiene/grooming.  Attitude/Behavior: Difficult to engage. Poor/limited eye contact.  Speech/Communication: Scripting.  Motor: Fidgets. Bounces in his seat. Tries to touch dad repeatedly.  Gait: Not assessed at this visit.  Mood:  Excited. Happy.  Affect:  Elevated.  Thought processes: Perseverative.  Thought content: Unable to assess.  Perception: Does not appear to be experiencing or responding to hallucinations.  Sensorium/Cognition: Oriented to self and surroundings. Intellectual impairment. Distracted. Minimal interest in  participating.  Memory: Recent & remote recall is intact.  Insight: Absent.  Judgment: Poor. Redirectable.     ASSESSMENT  Overall, Tyler is generally doing OK right now.  He seems to be tolerating his current treatments.  I would hold off on making additional changes unless or until we see some definite sustained increase in problems with aggression or agitation.  We discussed plan to have an in-person visit in 2024 for controlled substance prescribing.    PROBLEM LIST  ASD  Impulse control disorder  Disruptive behaviors  Epilepsy    PLAN  -- Continue ziprasidone 60mg - 40mg - 40mg (AM, 14:00, 19:00) for aggression and impulse control  -- Continue propranolol 60mg BID (AM and 14:00) for impulse control  -- Continue clonidine 0.1mg - 0.1mg - 0.2mg (AM, 14:00, 19:00) for impulse control  -- Continue hydroxyzine 25mg at 14:00 and 50mg at 19:00 for anxiety  -- Continue clonazepam 0.125mg to 0.25mg at bedtime for anxiety and sleep  -- Follow up 2 months    Faisal Mcadams MD    Prep time on date of the patient encounter: 5 minutes   Time spent directly with patient/family/caregiver: 25 minutes   Additional time spent on patient care activities: 0 minutes   Documentation time: 5 minutes   Other time spent: 0 minutes   Total time on date of patient encounter: 35 minutes

## 2024-02-20 ASSESSMENT — ENCOUNTER SYMPTOMS
SEIZURES: 0
CONSTIPATION: 0
ABDOMINAL PAIN: 0
SLEEP DISTURBANCE: 0
TREMORS: 0
MYALGIAS: 0
SHORTNESS OF BREATH: 0
TROUBLE SWALLOWING: 0
VOMITING: 0

## 2024-02-20 NOTE — PROGRESS NOTES
"Outpatient Psychiatry    A HIPAA-compliant interactive audio and video telecommunication system which permits real time communications between the patient (at the originating site) and provider (at the distant site) was utilized to provide this telehealth service.     The patient, family, caregivers and guardian (as appropriate) have provided consent on this date to conduct treatment via this telehealth service.  The patient's identity and physical location were verified at the time of this visit.      Present for appointment: Tyler and parents (Yosef and Chika).    SUBJECTIVE    Tyler has generally been doing OK over the past few months.     He has not had any new or significant health problems since we last met.  He will see his PCP in April for a yearly check-up.    Parents are talking with his SSA about touring a new location of the [employer] program in [place] when it opens in the next few months.    Parents feel that his mood has been a little more positive and happy with the nicer weather and increased sunshine we've been having.    He can sometimes start to get angry/frustrated when he gets impatient or has to wait, especially if this relates to playing games on his tablet.    He still has an independent provider/caregiver (Sanam) that works well with him.     He and dad have one weekend day together a month where they go out and do things that Tyler likes (their \"adventure\" day).  On other weekends dad is often around the house catching up on work and Tyler enjoys having him there.    He is still having some instances of being awake overnight or very early in the morning and can be overheard in his room scripting.  Sometimes he'll go back to sleep in the morning after breakfast and meds.    Weight and appetite are generally unchanged.  No other GI problems noted.  He does not appear to be having any EPS or TD.      He has not had any seizures in the interim.  Current ASM regimen remains lamotrigine 225mg " twice daily.  He has follow-up with neurology in March.     Review of Systems   HENT:  Negative for drooling and trouble swallowing.    Respiratory:  Negative for shortness of breath.    Cardiovascular:  Negative for chest pain.   Gastrointestinal:  Negative for abdominal pain, constipation and vomiting.   Genitourinary:  Negative for enuresis.   Musculoskeletal:  Negative for gait problem and myalgias.   Neurological:  Negative for tremors, seizures and syncope.   Psychiatric/Behavioral:  Negative for sleep disturbance.       Controlled Substance Evaluation  OARRS/PDMP reviewed: Faisal Mcadams MD on 2/21/2024 12:46 PM  Is the patient prescribed a combination of a benzodiazepine and opioid? No  I have personally reviewed the OARRS report for Tyler Salinas.   I have considered the risks of abuse, dependence, addiction and diversion.    I believe that it is clinically appropriate for Tyler Salinas to be prescribed this medication.    Last Urine Drug Screen:  Recent Results (from the past 8760 hour(s))   OPIATE/OPIOID/BENZO PRESCRIPTION COMPLIANCE    Collection Time: 02/22/23 11:46 AM   Result Value Ref Range    DRUG SCREEN COMMENT URINE SEE BELOW     Creatine, Urine 136.2 mg/dL    Amphetamine Screen, Urine PRESUMPTIVE NEGATIVE NEGATIVE    Barbiturate Screen, Urine PRESUMPTIVE NEGATIVE NEGATIVE    Cannabinoid Screen, Urine PRESUMPTIVE NEGATIVE NEGATIVE    Cocaine Screen, Urine PRESUMPTIVE NEGATIVE NEGATIVE    PCP Screen, Urine PRESUMPTIVE NEGATIVE NEGATIVE    7-Aminoclonazepam 78 (A) Cutoff <25 ng/mL    Alpha-Hydroxyalprazolam <25 Cutoff <25 ng/mL    Alpha-Hydroxymidazolam <25 Cutoff <25 ng/mL    Alprazolam <25 Cutoff <25 ng/mL    Chlordiazepoxide <25 Cutoff <25 ng/mL    Clonazepam <25 Cutoff <25 ng/mL    Diazepam <25 Cutoff <25 ng/mL    Lorazepam <25 Cutoff <25 ng/mL    Midazolam <25 Cutoff <25 ng/mL    Nordiazepam <25 Cutoff <25 ng/mL    Oxazepam <25 Cutoff <25 ng/mL    Temazepam <25 Cutoff <25 ng/mL     Zolpidem <25 Cutoff <25 ng/mL    Zolpidem Metabolite (ZCA) <25 Cutoff <25 ng/mL    6-Acetylmorphine <25 Cutoff <25 ng/mL    Codeine <50 Cutoff <50 ng/mL    Hydrocodone <25 Cutoff <25 ng/mL    Hydromorphone <25 Cutoff <25 ng/mL    Morphine Urine <50 Cutoff <50 ng/mL    Norhydrocodone <25 Cutoff <25 ng/mL    Noroxycodone <25 Cutoff <25 ng/mL    Oxycodone <25 Cutoff <25 ng/mL    Oxymorphone <25 Cutoff <25 ng/mL    Tramadol <50 Cutoff <50 ng/mL    O-Desmethyltramadol <50 Cutoff <50 ng/mL    Fentanyl <2.5 Cutoff<2.5 ng/mL    Norfentanyl <2.5 Cutoff<2.5 ng/mL    METHADONE CONFIRMATION,URINE <25 Cutoff <25 ng/mL    EDDP <25 Cutoff <25 ng/mL     Results as expected? Yes    Controlled Substance Agreement: 01/25/2023  Reviewed Controlled Substance Agreement, including but not limited to:  Benefits, risks, and alternatives to treatment with a controlled substance medication(s).    Prescribed Controlled Substances:  Benzodiazepines: Clonazepam  What is the patient's goal of therapy? Sleep/agitation  Is this being achieved with current treatment? Somewhat  Activities of Daily Living:   Is your overall impression that this patient is benefiting (symptom reduction outweighs side effects) from benzodiazepine therapy? Yes   1. Physical Functioning: Same  2. Family Relationship: Same  3. Social Relationship: Same  4. Mood: Same  5. Sleep Patterns: Same  6. Overall Function: Same     MEDICATION HISTORY  Aripiprazole  Fluoxetine  Quetiapine  Risperidone    CURRENT MEDICATIONS    Current Outpatient Medications:     chlorhexidine (Hibiclens) 4 % external liquid, USE AS DIRECTED. 20 ml mixed into bath soak for 2 hours two times per week, Disp: , Rfl:     clonazePAM (KlonoPIN) 1 mg tablet, 1/4 TO 1/2 TABLET DAILY AS NEEDED, Disp: 30 tablet, Rfl: 0    clonazePAM (KlonoPIN) 2 mg disintegrating tablet, Take by mouth., Disp: , Rfl:     cloNIDine (Catapres) 0.1 mg tablet, TAKE 1 TABLET TWICE DAILY (MORNING AND 2PM), Disp: 60 tablet, Rfl: 1     cloNIDine (Catapres) 0.2 mg tablet, Take 1 tablet (0.2 mg) by mouth once daily at bedtime., Disp: 30 tablet, Rfl: 1    fluticasone (Flonase) 50 mcg/actuation nasal spray, Administer 2 sprays into affected nostril(s) once daily., Disp: , Rfl:     hydrOXYzine HCL (Atarax) 25 mg tablet, 1 tablet at 2pm and 2 tablets at bedtime, Disp: 90 tablet, Rfl: 1    ketoconazole (NIZOral) 2 % cream, Apply topically 2 times a day., Disp: 60 g, Rfl: 2    lamoTRIgine (LaMICtal) 25 mg chewable tablet, TAKE 9 CHEWS TWICE DAILY., Disp: 540 tablet, Rfl: 5    multivitamin tablet, Take 1 tablet by mouth once daily., Disp: , Rfl:     propranolol (Inderal) 60 mg tablet, 1 tablet twice daily (morning and 2pm), Disp: 60 tablet, Rfl: 1    ziprasidone (Geodon) 40 mg capsule, TAKE 1 CAPSULE TWICE DAILY (2PM AND BEDTIME), Disp: 60 capsule, Rfl: 1    ziprasidone (Geodon) 60 mg capsule, TAKE 1 CAPSULE EVERY MORNING, Disp: 30 capsule, Rfl: 1     SOCIAL HISTORY  Living situation Lives with parents   Provider agency N/A   Work or day program None currently   School [employer] - graduated 2021   Guardianship Mother (Chika)   SSA ?   Bx Specialist ?   Nicotine None   Alcohol None   Other drugs None     OBJECTIVE    Lab Results   Component Value Date    HGB 16.4 10/19/2023     10/19/2023    NEUTROABS 5.67 10/19/2023    GLUCOSE 102 (H) 05/12/2022     05/12/2022    K 4.6 05/12/2022    CO2 27 05/12/2022    CALCIUM 10.0 05/12/2022    CREATININE 0.94 05/12/2022    AST 40 (H) 10/19/2023    ALT 84 (H) 10/19/2023    HGBA1C 6.0 (H) 10/19/2023    TSH 1.23 06/01/2023    FREET4 1.34 06/01/2023    CHOL 199 06/01/2023    LDLF 124 (H) 06/01/2023    TRIG 145 06/01/2023    VITD25 30 01/29/2020     Therapeutic Drug Monitoring  10/19/2023: Lamotrigine level = 9.2 [2.5-15] (450mg/day)  08/19/2020: Lamotrigine level = 5.0 [2.5-15] (450mg/day)    Electrocardiograms  04/22/2019: Sinus tachycardia, , QTc 432    MENTAL STATUS EXAM  General/Appearance:  Appropriate dress/hygiene/grooming.  Attitude/Behavior: Difficult to engage. Poor/limited eye contact.  Speech/Communication: Scripting.  Motor: Fidgets.  Gait: Not assessed at this visit.  Mood:  Somewhat anxious.  Affect: Anxious. Congruent.  Thought processes: Perseverative.  Thought content:  Repeatedly brings up future events/dates when he finds out about an upcoming schedule change for dad in May.  Perception: Does not appear to be experiencing or responding to hallucinations.  Sensorium/Cognition: Oriented to self and surroundings. Intellectual impairment. Distracted. Minimal interest in participating.  Memory: Recent & remote recall is intact.  Insight: Absent.  Judgment: Fair. Redirectable.     ASSESSMENT  Overall, Tyler is generally doing OK right now.  He seems to be tolerating his current treatments.  Parents are hoping to try having him at a day program again in the spring/summer, but Tyler has often had problems with transportation providers and this has been a limiting factor in getting him to attend consistently.  He remains very rigid/inflexible and routine-oriented and disruptions with this can be a contributing factor to behavioral problems.  I would continue his current treatment regimen unless or until we see some definite sustained increase in problems with aggression or agitation, especially in light of possible resumption of day programming.  Updated/annual CSA and urine drug screen orders sent to parents.We discussed plan to have an in-person visit in 2024 for controlled substance prescribing.    PROBLEM LIST  ASD  Impulse control disorder  Disruptive behaviors  Epilepsy    PLAN  -- Continue ziprasidone 60mg - 40mg - 40mg (AM, 14:00, 19:00) for aggression and impulse control  -- Continue propranolol 60mg BID (AM and 14:00) for impulse control  -- Continue clonidine 0.1mg - 0.1mg - 0.2mg (AM, 14:00, 19:00) for impulse control  -- Continue hydroxyzine 25mg at 14:00 and 50mg at 19:00 for  anxiety  -- Continue clonazepam 0.125mg to 0.25mg at bedtime for anxiety and sleep  -- Follow up 2-3 months    Faisal Mcadams MD    Prep time on date of the patient encounter: 5 minutes   Time spent directly with patient/family/caregiver: 25 minutes   Additional time spent on patient care activities: 0 minutes   Documentation time: 5 minutes   Other time spent: 0 minutes   Total time on date of patient encounter: 35 minutes

## 2024-02-21 ENCOUNTER — OFFICE VISIT (OUTPATIENT)
Dept: BEHAVIORAL HEALTH | Facility: CLINIC | Age: 24
End: 2024-02-21
Payer: COMMERCIAL

## 2024-02-21 DIAGNOSIS — F84.0 AUTISM SPECTRUM DISORDER (HHS-HCC): ICD-10-CM

## 2024-02-21 DIAGNOSIS — F63.9 IMPULSE CONTROL DISORDER: ICD-10-CM

## 2024-02-21 DIAGNOSIS — F84.0 AUTISM DISORDER (HHS-HCC): ICD-10-CM

## 2024-02-21 DIAGNOSIS — G40.309 GENERALIZED EPILEPSY (MULTI): ICD-10-CM

## 2024-02-21 DIAGNOSIS — F91.9 DISRUPTIVE BEHAVIOR DISORDER: Primary | ICD-10-CM

## 2024-02-21 PROCEDURE — 99214 OFFICE O/P EST MOD 30 MIN: CPT | Performed by: PSYCHIATRY & NEUROLOGY

## 2024-02-21 PROCEDURE — 1036F TOBACCO NON-USER: CPT | Performed by: PSYCHIATRY & NEUROLOGY

## 2024-02-21 RX ORDER — ZIPRASIDONE HYDROCHLORIDE 60 MG/1
60 CAPSULE ORAL
Qty: 30 CAPSULE | Refills: 2 | Status: SHIPPED | OUTPATIENT
Start: 2024-02-21 | End: 2024-05-08 | Stop reason: SDUPTHER

## 2024-02-21 RX ORDER — CLONIDINE HYDROCHLORIDE 0.1 MG/1
TABLET ORAL
Qty: 60 TABLET | Refills: 2 | Status: SHIPPED | OUTPATIENT
Start: 2024-02-21 | End: 2024-05-08 | Stop reason: SDUPTHER

## 2024-02-21 RX ORDER — CLONAZEPAM 1 MG/1
TABLET ORAL
Qty: 30 TABLET | Refills: 1 | Status: SHIPPED | OUTPATIENT
Start: 2024-02-21 | End: 2024-03-07 | Stop reason: SDUPTHER

## 2024-02-21 RX ORDER — HYDROXYZINE HYDROCHLORIDE 25 MG/1
TABLET, FILM COATED ORAL
Qty: 90 TABLET | Refills: 2 | Status: SHIPPED | OUTPATIENT
Start: 2024-02-21 | End: 2024-05-08 | Stop reason: SDUPTHER

## 2024-02-21 RX ORDER — PROPRANOLOL HYDROCHLORIDE 60 MG/1
TABLET ORAL
Qty: 60 TABLET | Refills: 2 | Status: SHIPPED | OUTPATIENT
Start: 2024-02-21 | End: 2024-05-08 | Stop reason: DRUGHIGH

## 2024-02-21 RX ORDER — CLONIDINE HYDROCHLORIDE 0.2 MG/1
0.2 TABLET ORAL NIGHTLY
Qty: 30 TABLET | Refills: 2 | Status: SHIPPED | OUTPATIENT
Start: 2024-02-21 | End: 2024-05-08 | Stop reason: SDUPTHER

## 2024-02-21 RX ORDER — ZIPRASIDONE HYDROCHLORIDE 40 MG/1
CAPSULE ORAL
Qty: 60 CAPSULE | Refills: 2 | Status: SHIPPED | OUTPATIENT
Start: 2024-02-21 | End: 2024-05-08 | Stop reason: SDUPTHER

## 2024-03-04 ENCOUNTER — APPOINTMENT (OUTPATIENT)
Dept: NEUROLOGY | Facility: CLINIC | Age: 24
End: 2024-03-04
Payer: COMMERCIAL

## 2024-03-07 ENCOUNTER — OFFICE VISIT (OUTPATIENT)
Dept: NEUROLOGY | Facility: CLINIC | Age: 24
End: 2024-03-07
Payer: COMMERCIAL

## 2024-03-07 VITALS
BODY MASS INDEX: 44.86 KG/M2 | HEIGHT: 67 IN | DIASTOLIC BLOOD PRESSURE: 78 MMHG | SYSTOLIC BLOOD PRESSURE: 116 MMHG | RESPIRATION RATE: 16 BRPM | WEIGHT: 285.8 LBS | TEMPERATURE: 97.1 F | HEART RATE: 81 BPM

## 2024-03-07 DIAGNOSIS — F63.9 IMPULSE CONTROL DISORDER: ICD-10-CM

## 2024-03-07 DIAGNOSIS — G40.309 GENERALIZED EPILEPSY (MULTI): Primary | ICD-10-CM

## 2024-03-07 DIAGNOSIS — F84.0 AUTISM SPECTRUM DISORDER (HHS-HCC): ICD-10-CM

## 2024-03-07 DIAGNOSIS — F91.9 DISRUPTIVE BEHAVIOR DISORDER: ICD-10-CM

## 2024-03-07 PROCEDURE — 1036F TOBACCO NON-USER: CPT | Performed by: PSYCHIATRY & NEUROLOGY

## 2024-03-07 PROCEDURE — 99215 OFFICE O/P EST HI 40 MIN: CPT | Performed by: PSYCHIATRY & NEUROLOGY

## 2024-03-07 RX ORDER — LAMOTRIGINE 200 MG/1
200 TABLET ORAL 2 TIMES DAILY
Qty: 60 TABLET | Refills: 0 | Status: SHIPPED | OUTPATIENT
Start: 2024-03-07 | End: 2024-04-17 | Stop reason: ALTCHOICE

## 2024-03-07 RX ORDER — PROPRANOLOL HYDROCHLORIDE 40 MG/1
40 TABLET ORAL 2 TIMES DAILY
COMMUNITY
End: 2024-05-08 | Stop reason: SDUPTHER

## 2024-03-07 RX ORDER — LAMOTRIGINE 25 MG/1
225 TABLET, CHEWABLE ORAL 2 TIMES DAILY
Qty: 540 TABLET | Refills: 5 | Status: SHIPPED | OUTPATIENT
Start: 2024-03-07

## 2024-03-07 RX ORDER — CLONAZEPAM 1 MG/1
TABLET ORAL
Qty: 30 TABLET | Refills: 1 | Status: SHIPPED | OUTPATIENT
Start: 2024-03-07 | End: 2024-05-08 | Stop reason: SDUPTHER

## 2024-03-07 ASSESSMENT — ENCOUNTER SYMPTOMS
DEPRESSION: 0
OCCASIONAL FEELINGS OF UNSTEADINESS: 0
LOSS OF SENSATION IN FEET: 0
SEIZURES: 1
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
## 2024-03-07 NOTE — PATIENT INSTRUCTIONS
The patient is doing well from a neurological standpoint.  I would certainly continue his Lamictal at the current dose of 225 mg by mouth twice a day.  day.   I will give the patient Lamictal 200 mg tabs twice daily to see if he can swallow these pills.  I did tell the family to ask for about 60 tablets out of the 540 that I gave him for the 25 mg tabs.  My hope is that this will decrease the burden of pills that the patient has to take on a daily basis.  The patient should continue his clonazepam.  The patient needs a CBC and liver function test done every 6 months while on this medicine.  The patient needs to get at least 8 hours of sleep a night.  The patient should continue to follow-up with psychiatry and continue his psych meds.  I discussed all these issues in detail with the patient and his mother and answered all their questions.  The patient will follow up with me in 6 months.

## 2024-04-17 ENCOUNTER — OFFICE VISIT (OUTPATIENT)
Dept: PRIMARY CARE | Facility: CLINIC | Age: 24
End: 2024-04-17
Payer: COMMERCIAL

## 2024-04-17 VITALS
DIASTOLIC BLOOD PRESSURE: 82 MMHG | HEIGHT: 67 IN | WEIGHT: 296 LBS | OXYGEN SATURATION: 98 % | HEART RATE: 78 BPM | BODY MASS INDEX: 46.46 KG/M2 | SYSTOLIC BLOOD PRESSURE: 110 MMHG

## 2024-04-17 DIAGNOSIS — Z00.00 HEALTHCARE MAINTENANCE: Primary | ICD-10-CM

## 2024-04-17 DIAGNOSIS — L21.9 SEBORRHEIC DERMATITIS: ICD-10-CM

## 2024-04-17 PROCEDURE — 99213 OFFICE O/P EST LOW 20 MIN: CPT | Performed by: INTERNAL MEDICINE

## 2024-04-17 PROCEDURE — 1036F TOBACCO NON-USER: CPT | Performed by: INTERNAL MEDICINE
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
## 2024-04-24 ENCOUNTER — APPOINTMENT (OUTPATIENT)
Dept: PRIMARY CARE | Facility: CLINIC | Age: 24
End: 2024-04-24
Payer: COMMERCIAL

## 2024-05-08 ENCOUNTER — OFFICE VISIT (OUTPATIENT)
Dept: BEHAVIORAL HEALTH | Facility: CLINIC | Age: 24
End: 2024-05-08
Payer: COMMERCIAL

## 2024-05-08 DIAGNOSIS — F63.9 IMPULSE CONTROL DISORDER: Primary | ICD-10-CM

## 2024-05-08 DIAGNOSIS — F84.0 AUTISM DISORDER (HHS-HCC): ICD-10-CM

## 2024-05-08 DIAGNOSIS — F91.9 DISRUPTIVE BEHAVIOR DISORDER: ICD-10-CM

## 2024-05-08 DIAGNOSIS — G40.309 GENERALIZED EPILEPSY (MULTI): ICD-10-CM

## 2024-05-08 DIAGNOSIS — F84.0 AUTISM SPECTRUM DISORDER (HHS-HCC): ICD-10-CM

## 2024-05-08 PROCEDURE — 1036F TOBACCO NON-USER: CPT | Performed by: PSYCHIATRY & NEUROLOGY

## 2024-05-08 PROCEDURE — 99214 OFFICE O/P EST MOD 30 MIN: CPT | Performed by: PSYCHIATRY & NEUROLOGY

## 2024-05-08 RX ORDER — CLONIDINE HYDROCHLORIDE 0.2 MG/1
0.2 TABLET ORAL NIGHTLY
Qty: 90 TABLET | Refills: 0 | Status: SHIPPED | OUTPATIENT
Start: 2024-05-08

## 2024-05-08 RX ORDER — ZIPRASIDONE HYDROCHLORIDE 60 MG/1
60 CAPSULE ORAL
Qty: 90 CAPSULE | Refills: 0 | Status: SHIPPED | OUTPATIENT
Start: 2024-05-08

## 2024-05-08 RX ORDER — CLONIDINE HYDROCHLORIDE 0.1 MG/1
TABLET ORAL
Qty: 180 TABLET | Refills: 0 | Status: SHIPPED | OUTPATIENT
Start: 2024-05-08

## 2024-05-08 RX ORDER — PROPRANOLOL HYDROCHLORIDE 40 MG/1
40 TABLET ORAL 2 TIMES DAILY
Qty: 180 TABLET | Refills: 0 | Status: SHIPPED | OUTPATIENT
Start: 2024-05-08

## 2024-05-08 RX ORDER — ZIPRASIDONE HYDROCHLORIDE 40 MG/1
CAPSULE ORAL
Qty: 180 CAPSULE | Refills: 0 | Status: SHIPPED | OUTPATIENT
Start: 2024-05-08

## 2024-05-08 RX ORDER — HYDROXYZINE HYDROCHLORIDE 25 MG/1
TABLET, FILM COATED ORAL
Qty: 270 TABLET | Refills: 0 | Status: SHIPPED | OUTPATIENT
Start: 2024-05-08

## 2024-05-08 RX ORDER — CLONAZEPAM 1 MG/1
TABLET ORAL
Qty: 30 TABLET | Refills: 1 | Status: SHIPPED | OUTPATIENT
Start: 2024-05-08

## 2024-05-08 ASSESSMENT — ENCOUNTER SYMPTOMS
SHORTNESS OF BREATH: 0
MYALGIAS: 0
CONSTIPATION: 0
SLEEP DISTURBANCE: 0
TREMORS: 0
ABDOMINAL PAIN: 0
TROUBLE SWALLOWING: 0
VOMITING: 0
SEIZURES: 0

## 2024-05-08 NOTE — PROGRESS NOTES
Outpatient Psychiatry    A HIPAA-compliant interactive audio and video telecommunication system which permits real time communications between the patient (at the originating site) and provider (at the distant site) was utilized to provide this telehealth service.     The patient, family, caregivers and guardian (as appropriate) have provided consent on this date to conduct treatment via this telehealth service.  The patient's identity and physical location were verified at the time of this visit.      Present for appointment: Tyler and parents (Yosef and Chika).    SUBJECTIVE    Saw PCP and neurology since we last met.  No treatment changes made.  Needs to have annual labs done.  Will be re-starting day program with [employer] in [place] sometime in June; tentative plan is to have him attending a full 5 days per week right from the start.  Mood has been pretty good.  Less frustration over video games (has to wait to play FransiscoRoundarch on his tablet after dad gets home in the afternoon).  He still has an independent provider/caregiver (Sanam) that works with him, although she has not been able to work a full schedule lately due to her own health problems.  He is still having some instances of being awake overnight or very early in the morning and can be overheard in his room scripting.  Sometimes he'll go back to sleep in the morning after breakfast and meds.  Weight and appetite are generally unchanged.  No other GI problems noted.  He does not appear to be having any EPS or TD.  He has not had any seizures in the interim.    Review of Systems   HENT:  Negative for drooling and trouble swallowing.    Respiratory:  Negative for shortness of breath.    Cardiovascular:  Negative for chest pain.   Gastrointestinal:  Negative for abdominal pain, constipation and vomiting.   Genitourinary:  Negative for enuresis.   Musculoskeletal:  Negative for gait problem and myalgias.   Neurological:  Negative for tremors, seizures and  syncope.   Psychiatric/Behavioral:  Negative for sleep disturbance.       Controlled Substance Evaluation  OARRS/PDMP reviewed: Faisal Mcadams MD on 5/8/2024  1:13 PM  Is the patient prescribed a combination of a benzodiazepine and opioid? No  I have personally reviewed the OARRS report for Tyler Salinas.   I have considered the risks of abuse, dependence, addiction and diversion.    I believe that it is clinically appropriate for Tyler Salinas to be prescribed this medication.    Last Urine Drug Screen:  No results found for this or any previous visit (from the past 8760 hour(s)).    Controlled Substance Agreement: 03/06/2024  Reviewed Controlled Substance Agreement, including but not limited to:  Benefits, risks, and alternatives to treatment with a controlled substance medication(s).    Prescribed Controlled Substances:  > Benzodiazepines: Clonazepam  What is the patient's goal of therapy? Sleep/agitation  Is this being achieved with current treatment? Somewhat  Activities of Daily Living:   Is your overall impression that this patient is benefiting (symptom reduction outweighs side effects) from benzodiazepine therapy? Yes   1. Physical Functioning: Same  2. Family Relationship: Same  3. Social Relationship: Same  4. Mood: Same  5. Sleep Patterns: Same  6. Overall Function: Same     MEDICATION HISTORY  Aripiprazole  Fluoxetine  Quetiapine  Risperidone    CURRENT MEDICATIONS    Current Outpatient Medications:     clonazePAM (KlonoPIN) 1 mg tablet, Take 1/4 to 1/2 tablet daily at bedtime as needed for sleep., Disp: 30 tablet, Rfl: 1    cloNIDine (Catapres) 0.1 mg tablet, Take 1 tablet (0.1 mg) by mouth twice daily - morning and 2pm., Disp: 180 tablet, Rfl: 0    cloNIDine (Catapres) 0.2 mg tablet, Take 1 tablet (0.2 mg) by mouth once daily at bedtime., Disp: 90 tablet, Rfl: 0    hydrOXYzine HCL (Atarax) 25 mg tablet, Take 1 tablet (25 mg) by mouth at 2pm and 2 tablets (50 mg) by mouth at bedtime.,  Disp: 270 tablet, Rfl: 0    lamoTRIgine (LaMICtal) 25 mg chewable tablet, Take 9 tablets (225 mg) by mouth 2 times a day., Disp: 540 tablet, Rfl: 5    propranolol (Inderal) 40 mg tablet, Take 1 tablet (40 mg) by mouth 2 times a day., Disp: 180 tablet, Rfl: 0    ziprasidone (Geodon) 40 mg capsule, Take 1 capsule (40 mg) by mouth twice daily with food - 2pm and bedtime., Disp: 180 capsule, Rfl: 0    ziprasidone (Geodon) 60 mg capsule, Take 1 capsule (60 mg) by mouth once daily with breakfast., Disp: 90 capsule, Rfl: 0    chlorhexidine (Hibiclens) 4 % external liquid, USE AS DIRECTED. 20 ml mixed into bath soak for 2 hours two times per week, Disp: , Rfl:     clonazePAM (KlonoPIN) 2 mg disintegrating tablet, Take by mouth., Disp: , Rfl:     fluticasone (Flonase) 50 mcg/actuation nasal spray, Administer 2 sprays into affected nostril(s) once daily., Disp: , Rfl:     ketoconazole (NIZOral) 2 % cream, Apply topically 2 times a day., Disp: 60 g, Rfl: 2    multivitamin tablet, Take 1 tablet by mouth once daily., Disp: , Rfl:      SOCIAL HISTORY  Living situation Lives with parents   Provider agency N/A   Work or day program None currently   School [employer] - graduated 2021   Guardianship Mother (Chika)   SSA ?   Bx Specialist ?   Nicotine None   Alcohol None   Other drugs None     OBJECTIVE    Lab Results   Component Value Date    HGB 16.4 10/19/2023     10/19/2023    NEUTROABS 5.67 10/19/2023    GLUCOSE 102 (H) 05/12/2022     05/12/2022    K 4.6 05/12/2022    CO2 27 05/12/2022    CALCIUM 10.0 05/12/2022    CREATININE 0.94 05/12/2022    AST 40 (H) 10/19/2023    ALT 84 (H) 10/19/2023    HGBA1C 6.0 (H) 10/19/2023    TSH 1.23 06/01/2023    FREET4 1.34 06/01/2023    CHOL 199 06/01/2023    LDLF 124 (H) 06/01/2023    TRIG 145 06/01/2023    VITD25 30 01/29/2020     Therapeutic Drug Monitoring  10/19/2023: Lamotrigine level = 9.2 [2.5-15] (450mg/day)  08/19/2020: Lamotrigine level = 5.0 [2.5-15]  (450mg/day)    Electrocardiograms  04/22/2019: Sinus tachycardia, , QTc 432    MENTAL STATUS EXAM  General/Appearance: Appropriate dress/hygiene/grooming.  Attitude/Behavior: Difficult to engage. Poor/limited eye contact.  Speech/Communication: Scripting.  Motor: No abnormal or involuntary movements observed.  Gait: Not assessed at this visit.  Mood:  Somewhat anxious.  Affect: Anxious. Congruent.  Thought processes: Perseverative.  Thought content: Unable to assess.  Perception: Does not appear to be experiencing or responding to hallucinations.  Sensorium/Cognition: Oriented to self and surroundings. Intellectual impairment. Distracted. Minimal interest in participating.  Memory: Recent & remote recall is intact.  Insight: Minimal. Does not seem enthusiastic about discussion of day program.  Judgment: Fair. Redirectable.     ASSESSMENT  Tyler is doing fairly well right now.  We will see how he adjusts to returning to day program full-time in a month or so.  We can try reducing propranolol to address daytime sleepiness.  Would probably favor reducing and/or removing beta blocker in exchange for higher doses of ziprasidone if needed to manage behavioral problems in the coming months.    PROBLEM LIST  ASD  Impulse control disorder  Disruptive behaviors  Epilepsy    PLAN  -- Decrease propranolol to 40mg BID (AM and 14:00) for impulse control  -- Continue ziprasidone 60mg - 40mg - 40mg (AM, 14:00, 19:00) for aggression and impulse control  -- Continue clonidine 0.1mg - 0.1mg - 0.2mg (AM, 14:00, 19:00) for impulse control  -- Continue hydroxyzine 25mg at 14:00 and 50mg at 19:00 for anxiety  -- Continue clonazepam 0.125mg to 0.25mg at bedtime for anxiety and sleep  -- Follow up 2 months    Faisal Mcadams MD    Prep time on date of the patient encounter: 5 minutes   Time spent directly with patient/family/caregiver: 25 minutes   Additional time spent on patient care activities: 0 minutes   Documentation  time: 5 minutes   Other time spent: 0 minutes   Total time on date of patient encounter: 35 minutes

## 2024-05-20 ENCOUNTER — LAB (OUTPATIENT)
Dept: LAB | Facility: LAB | Age: 24
End: 2024-05-20
Payer: COMMERCIAL

## 2024-05-20 DIAGNOSIS — Z00.00 HEALTHCARE MAINTENANCE: ICD-10-CM

## 2024-05-20 DIAGNOSIS — G40.309 GENERALIZED EPILEPSY (MULTI): ICD-10-CM

## 2024-05-20 LAB
ALBUMIN SERPL BCP-MCNC: 3.8 G/DL (ref 3.4–5)
ALP SERPL-CCNC: 77 U/L (ref 33–120)
ALT SERPL W P-5'-P-CCNC: 76 U/L (ref 10–52)
AMPHETAMINES UR QL SCN: NORMAL
ANION GAP SERPL CALC-SCNC: 15 MMOL/L (ref 10–20)
AST SERPL W P-5'-P-CCNC: 36 U/L (ref 9–39)
BARBITURATES UR QL SCN: NORMAL
BASOPHILS # BLD AUTO: 0.08 X10*3/UL (ref 0–0.1)
BASOPHILS NFR BLD AUTO: 0.8 %
BILIRUB DIRECT SERPL-MCNC: 0.2 MG/DL (ref 0–0.3)
BILIRUB SERPL-MCNC: 1 MG/DL (ref 0–1.2)
BUN SERPL-MCNC: 15 MG/DL (ref 6–23)
BZE UR QL SCN: NORMAL
CALCIUM SERPL-MCNC: 9.4 MG/DL (ref 8.6–10.6)
CANNABINOIDS UR QL SCN: NORMAL
CHLORIDE SERPL-SCNC: 98 MMOL/L (ref 98–107)
CHOLEST SERPL-MCNC: 188 MG/DL (ref 0–199)
CHOLESTEROL/HDL RATIO: 4.8
CO2 SERPL-SCNC: 29 MMOL/L (ref 21–32)
CREAT SERPL-MCNC: 0.85 MG/DL (ref 0.5–1.3)
CREAT UR-MCNC: 149.2 MG/DL (ref 20–370)
EGFRCR SERPLBLD CKD-EPI 2021: >90 ML/MIN/1.73M*2
EOSINOPHIL # BLD AUTO: 0.44 X10*3/UL (ref 0–0.7)
EOSINOPHIL NFR BLD AUTO: 4.3 %
ERYTHROCYTE [DISTWIDTH] IN BLOOD BY AUTOMATED COUNT: 12.3 % (ref 11.5–14.5)
EST. AVERAGE GLUCOSE BLD GHB EST-MCNC: 126 MG/DL
GLUCOSE SERPL-MCNC: 93 MG/DL (ref 74–99)
HBA1C MFR BLD: 6 %
HBV SURFACE AB SER-ACNC: <3.1 MIU/ML
HCT VFR BLD AUTO: 50.7 % (ref 41–52)
HDLC SERPL-MCNC: 38.9 MG/DL
HGB BLD-MCNC: 15.7 G/DL (ref 13.5–17.5)
IMM GRANULOCYTES # BLD AUTO: 0.04 X10*3/UL (ref 0–0.7)
IMM GRANULOCYTES NFR BLD AUTO: 0.4 % (ref 0–0.9)
LDLC SERPL CALC-MCNC: 117 MG/DL
LYMPHOCYTES # BLD AUTO: 3.59 X10*3/UL (ref 1.2–4.8)
LYMPHOCYTES NFR BLD AUTO: 34.7 %
MCH RBC QN AUTO: 27.3 PG (ref 26–34)
MCHC RBC AUTO-ENTMCNC: 31 G/DL (ref 32–36)
MCV RBC AUTO: 88 FL (ref 80–100)
MONOCYTES # BLD AUTO: 1.05 X10*3/UL (ref 0.1–1)
MONOCYTES NFR BLD AUTO: 10.2 %
NEUTROPHILS # BLD AUTO: 5.14 X10*3/UL (ref 1.2–7.7)
NEUTROPHILS NFR BLD AUTO: 49.6 %
NON HDL CHOLESTEROL: 149 MG/DL (ref 0–149)
NRBC BLD-RTO: 0 /100 WBCS (ref 0–0)
PCP UR QL SCN: NORMAL
PLATELET # BLD AUTO: 295 X10*3/UL (ref 150–450)
POTASSIUM SERPL-SCNC: 4.5 MMOL/L (ref 3.5–5.3)
PROT SERPL-MCNC: 6.9 G/DL (ref 6.4–8.2)
RBC # BLD AUTO: 5.76 X10*6/UL (ref 4.5–5.9)
SODIUM SERPL-SCNC: 137 MMOL/L (ref 136–145)
T4 FREE SERPL-MCNC: 1.27 NG/DL (ref 0.78–1.48)
TRIGL SERPL-MCNC: 160 MG/DL (ref 0–149)
TSH SERPL-ACNC: 1.89 MIU/L (ref 0.44–3.98)
VLDL: 32 MG/DL (ref 0–40)
WBC # BLD AUTO: 10.3 X10*3/UL (ref 4.4–11.3)

## 2024-05-20 PROCEDURE — 80358 DRUG SCREENING METHADONE: CPT

## 2024-05-20 PROCEDURE — 85025 COMPLETE CBC W/AUTO DIFF WBC: CPT

## 2024-05-20 PROCEDURE — 82570 ASSAY OF URINE CREATININE: CPT

## 2024-05-20 PROCEDURE — 82248 BILIRUBIN DIRECT: CPT

## 2024-05-20 PROCEDURE — 80354 DRUG SCREENING FENTANYL: CPT

## 2024-05-20 PROCEDURE — 80061 LIPID PANEL: CPT

## 2024-05-20 PROCEDURE — 80368 SEDATIVE HYPNOTICS: CPT

## 2024-05-20 PROCEDURE — 80053 COMPREHEN METABOLIC PANEL: CPT

## 2024-05-20 PROCEDURE — 80346 BENZODIAZEPINES1-12: CPT

## 2024-05-20 PROCEDURE — 80361 OPIATES 1 OR MORE: CPT

## 2024-05-20 PROCEDURE — 80373 DRUG SCREENING TRAMADOL: CPT

## 2024-05-20 PROCEDURE — 80365 DRUG SCREENING OXYCODONE: CPT

## 2024-05-20 PROCEDURE — 80307 DRUG TEST PRSMV CHEM ANLYZR: CPT

## 2024-05-20 PROCEDURE — 86706 HEP B SURFACE ANTIBODY: CPT

## 2024-05-20 PROCEDURE — 36415 COLL VENOUS BLD VENIPUNCTURE: CPT

## 2024-05-20 PROCEDURE — 84439 ASSAY OF FREE THYROXINE: CPT

## 2024-05-20 PROCEDURE — 84443 ASSAY THYROID STIM HORMONE: CPT

## 2024-05-20 PROCEDURE — 83036 HEMOGLOBIN GLYCOSYLATED A1C: CPT

## 2024-05-24 LAB
1OH-MIDAZOLAM UR CFM-MCNC: <25 NG/ML
6MAM UR CFM-MCNC: <25 NG/ML
7AMINOCLONAZEPAM UR CFM-MCNC: 114 NG/ML
A-OH ALPRAZ UR CFM-MCNC: <25 NG/ML
ALPRAZ UR CFM-MCNC: <25 NG/ML
CHLORDIAZEP UR CFM-MCNC: <25 NG/ML
CLONAZEPAM UR CFM-MCNC: <25 NG/ML
CODEINE UR CFM-MCNC: <50 NG/ML
DIAZEPAM UR CFM-MCNC: <25 NG/ML
EDDP UR CFM-MCNC: <25 NG/ML
FENTANYL UR CFM-MCNC: <2.5 NG/ML
HYDROCODONE CTO UR CFM-MCNC: <25 NG/ML
HYDROMORPHONE UR CFM-MCNC: <25 NG/ML
LORAZEPAM UR CFM-MCNC: <25 NG/ML
METHADONE UR CFM-MCNC: <25 NG/ML
MIDAZOLAM UR CFM-MCNC: <25 NG/ML
MORPHINE UR CFM-MCNC: <50 NG/ML
NORDIAZEPAM UR CFM-MCNC: <25 NG/ML
NORFENTANYL UR CFM-MCNC: <2.5 NG/ML
NORHYDROCODONE UR CFM-MCNC: <25 NG/ML
NOROXYCODONE UR CFM-MCNC: <25 NG/ML
NORTRAMADOL UR-MCNC: <50 NG/ML
OXAZEPAM UR CFM-MCNC: <25 NG/ML
OXYCODONE UR CFM-MCNC: <25 NG/ML
OXYMORPHONE UR CFM-MCNC: <25 NG/ML
TEMAZEPAM UR CFM-MCNC: <25 NG/ML
TRAMADOL UR CFM-MCNC: <50 NG/ML
ZOLPIDEM UR CFM-MCNC: <25 NG/ML
ZOLPIDEM UR-MCNC: <25 NG/ML

## 2024-06-03 DIAGNOSIS — Z01.84 LACK OF IMMUNITY TO HEPATITIS B VIRUS DEMONSTRATED BY SEROLOGIC TEST: Primary | ICD-10-CM

## 2024-07-09 ASSESSMENT — ENCOUNTER SYMPTOMS
CONSTIPATION: 0
SLEEP DISTURBANCE: 0
ABDOMINAL PAIN: 0
SHORTNESS OF BREATH: 0
SEIZURES: 0
TROUBLE SWALLOWING: 0
TREMORS: 0
MYALGIAS: 0
VOMITING: 0

## 2024-07-09 NOTE — PROGRESS NOTES
"Outpatient Adult IDD Psychiatry    A HIPAA-compliant interactive audio and video telecommunication system which permits real time communications between the patient (at the originating site) and provider (at the distant site) was utilized to provide this telehealth service.     The patient, family, caregivers and guardian (as appropriate) have provided consent on this date to conduct treatment via this telehealth service.  The patient's identity and physical location were verified at the time of this visit.      Present for appointment: Tyler and parents (Yosef and Chika).    SUBJECTIVE    No new or acute health concerns for Tyler since last visit.      His SSA is still trying to find him a suitable day program and he is reportedly on a wait list for a different [facility] location.      Parents report that his independent provider has been somewhat \"hit or miss\" lately in terms of how consistently she's been working with Tyler.      He's not really had any major behavioral issues since we last met, and seems no worse since reducing the dose of propranolol at the last visit.      His sleep patterns are mostly unchanged and he still naps often in the early part of the day.      Weight and appetite are generally unchanged.  No other GI problems noted.      He does not appear to be having any EPS or TD.  He has not had any seizures in the interim.    Review of Systems   HENT:  Negative for drooling and trouble swallowing.    Respiratory:  Negative for shortness of breath.    Cardiovascular:  Negative for chest pain.   Gastrointestinal:  Negative for abdominal pain, constipation and vomiting.   Genitourinary:  Negative for enuresis.   Musculoskeletal:  Negative for gait problem and myalgias.   Neurological:  Negative for tremors, seizures and syncope.   Psychiatric/Behavioral:  Negative for sleep disturbance.       Controlled Substance Evaluation  OARRS/PDMP reviewed: Faisal Mcadams MD on 7/10/2024  2:16 " PM  Is the patient prescribed a combination of a benzodiazepine and opioid? No  I have personally reviewed the OARRS report for Tyler Salinas.   I have considered the risks of abuse, dependence, addiction and diversion.    I believe that it is clinically appropriate for Tyler Salinas to be prescribed this medication.    Last Urine Drug Screen:  Recent Results (from the past 8760 hour(s))   Confirmation Opiate/Opioid/Benzo Prescription Compliance    Collection Time: 05/20/24  7:46 AM   Result Value Ref Range    Clonazepam <25 <25 ng/mL    7-Aminoclonazepam 114 (H) <25 ng/mL    Alprazolam <25 <25 ng/mL    Alpha-Hydroxyalprazolam <25 <25 ng/mL    Midazolam <25 <25 ng/mL    Alpha-Hydroxymidazolam <25 <25 ng/mL    Chlordiazepoxide <25 <25 ng/mL    Diazepam <25 <25 ng/mL    Nordiazepam <25 <25 ng/mL    Temazepam <25 <25 ng/mL    Oxazepam <25 <25 ng/mL    Lorazepam <25 <25 ng/mL    Methadone <25 <25 ng/mL    EDDP <25 <25 ng/mL    6-Acetylmorphine <25 <25 ng/mL    Codeine <50 <50 ng/mL    Hydrocodone <25 <25 ng/mL    Hydromorphone <25 <25 ng/mL    Morphine  <50 <50 ng/mL    Norhydrocodone <25 <25 ng/mL    Noroxycodone <25 <25 ng/mL    Oxycodone <25 <25 ng/mL    Oxymorphone <25 <25 ng/mL    Fentanyl <2.5 <2.5 ng/mL    Norfentanyl <2.5 <2.5 ng/mL    Tramadol <50 <50 ng/mL    O-Desmethyltramadol <50 <50 ng/mL    Zolpidem <25 <25 ng/mL    Zolpidem Metabolite (ZCA) <25 <25 ng/mL   Screen Opiate/Opioid/Benzo Prescription Compliance    Collection Time: 05/20/24  7:46 AM   Result Value Ref Range    Creatinine, Urine Random 149.2 20.0 - 370.0 mg/dL    Amphetamine Screen, Urine Presumptive Negative Presumptive Negative    Barbiturate Screen, Urine Presumptive Negative Presumptive Negative    Cannabinoid Screen, Urine Presumptive Negative Presumptive Negative    Cocaine Metabolite Screen, Urine Presumptive Negative Presumptive Negative    PCP Screen, Urine Presumptive Negative Presumptive Negative     Controlled Substance  Agreement: 03/06/2024  Reviewed Controlled Substance Agreement, including but not limited to:  Benefits, risks, and alternatives to treatment with a controlled substance medication(s).    Prescribed Controlled Substances:  > Benzodiazepines: Clonazepam  What is the patient's goal of therapy? Sleep/agitation  Is this being achieved with current treatment? Somewhat  Activities of Daily Living:   Is your overall impression that this patient is benefiting (symptom reduction outweighs side effects) from benzodiazepine therapy? Yes   1. Physical Functioning: Same  2. Family Relationship: Same  3. Social Relationship: Same  4. Mood: Same  5. Sleep Patterns: Same  6. Overall Function: Same     MEDICATION HISTORY  Aripiprazole  Fluoxetine  Quetiapine  Risperidone    CURRENT MEDICATIONS    Current Outpatient Medications:     chlorhexidine (Hibiclens) 4 % external liquid, USE AS DIRECTED. 20 ml mixed into bath soak for 2 hours two times per week, Disp: , Rfl:     clonazePAM (KlonoPIN) 1 mg tablet, Take 1/4 to 1/2 tablet daily at bedtime as needed for sleep., Disp: 30 tablet, Rfl: 1    clonazePAM (KlonoPIN) 2 mg disintegrating tablet, Take by mouth., Disp: , Rfl:     cloNIDine (Catapres) 0.1 mg tablet, Take 1 tablet (0.1 mg) by mouth twice daily - morning and 2pm., Disp: 180 tablet, Rfl: 3    cloNIDine (Catapres) 0.2 mg tablet, Take 1 tablet (0.2 mg) by mouth once daily at bedtime., Disp: 90 tablet, Rfl: 3    fluticasone (Flonase) 50 mcg/actuation nasal spray, Administer 2 sprays into affected nostril(s) once daily., Disp: , Rfl:     hydrOXYzine HCL (Atarax) 25 mg tablet, Take 1 tablet (25 mg) by mouth at 2pm and 2 tablets (50 mg) by mouth at bedtime., Disp: 270 tablet, Rfl: 3    ketoconazole (NIZOral) 2 % cream, Apply topically 2 times a day., Disp: 60 g, Rfl: 2    lamoTRIgine (LaMICtal) 25 mg chewable tablet, Take 9 tablets (225 mg) by mouth 2 times a day., Disp: 540 tablet, Rfl: 5    multivitamin tablet, Take 1 tablet by  mouth once daily., Disp: , Rfl:     propranolol (Inderal) 40 mg tablet, Take 1 tablet (40 mg) by mouth 2 times a day., Disp: 180 tablet, Rfl: 3    ziprasidone (Geodon) 40 mg capsule, Take 1 capsule (40 mg) by mouth twice daily with food - 2pm and bedtime., Disp: 180 capsule, Rfl: 3    ziprasidone (Geodon) 60 mg capsule, Take 1 capsule (60 mg) by mouth once daily with breakfast., Disp: 90 capsule, Rfl: 3     SOCIAL HISTORY  Living situation Lives with parents   Provider agency N/A   Work or day program None currently   School [employer] - graduated 2021   Guardianship Mother (Chika)   SSA ?   Bx Specialist ?   Nicotine None   Alcohol None   Other drugs None     OBJECTIVE    Lab Results   Component Value Date    HGB 15.7 05/20/2024     05/20/2024    NEUTROABS 5.14 05/20/2024    GLUCOSE 93 05/20/2024     05/20/2024    K 4.5 05/20/2024    CO2 29 05/20/2024    CALCIUM 9.4 05/20/2024    CREATININE 0.85 05/20/2024    AST 36 05/20/2024    ALT 76 (H) 05/20/2024    HGBA1C 6.0 (H) 05/20/2024    TSH 1.89 05/20/2024    FREET4 1.27 05/20/2024    CHOL 188 05/20/2024    LDLF 124 (H) 06/01/2023    TRIG 160 (H) 05/20/2024    VITD25 30 01/29/2020     Therapeutic Drug Monitoring  10/19/2023: Lamotrigine level = 9.2 [2.5-15] (450mg/day)  08/19/2020: Lamotrigine level = 5.0 [2.5-15] (450mg/day)    Electrocardiograms  04/22/2019: Sinus tachycardia, , QTc 432    MENTAL STATUS EXAM  General/Appearance: Appropriate dress/hygiene/grooming.  Attitude/Behavior: Difficult to engage. Poor/limited eye contact.  Speech/Communication: Scripting. Interrupts or talks over parents.  Motor: No abnormal or involuntary movements observed.  Gait: Not assessed at this visit.  Mood:  Anxious.  Affect: Anxious. Congruent.  Thought processes: Perseverative.  Thought content: Unable to assess.  Perception: Does not appear to be experiencing or responding to hallucinations.  Sensorium/Cognition: Oriented to self and surroundings. Intellectual  impairment. Distracted. Minimal interest in participating.  Memory: Recent & remote recall is intact.  Insight: Minimal.  Judgment: Fair. Redirectable.     ASSESSMENT  Tyler is doing fairly well right now.  We will continue his current treatments and reassess if/when he starts a day program.    PROBLEM LIST  ASD  Impulse control disorder  Disruptive behaviors  Epilepsy    PLAN  -- Continue propranolol 40mg BID (AM and 14:00) for impulse control  -- Continue ziprasidone 60mg - 40mg - 40mg (AM, 14:00, 19:00) for aggression and impulse control  -- Continue clonidine 0.1mg - 0.1mg - 0.2mg (AM, 14:00, 19:00) for impulse control  -- Continue hydroxyzine 25mg at 14:00 and 50mg at 19:00 for anxiety  -- Continue clonazepam 0.125mg to 0.25mg at bedtime for anxiety and sleep  -- Follow up 2-3 months    Faisal Mcadams MD

## 2024-07-10 ENCOUNTER — APPOINTMENT (OUTPATIENT)
Dept: BEHAVIORAL HEALTH | Facility: CLINIC | Age: 24
End: 2024-07-10
Payer: COMMERCIAL

## 2024-07-10 DIAGNOSIS — F63.9 IMPULSE CONTROL DISORDER: Primary | ICD-10-CM

## 2024-07-10 DIAGNOSIS — G40.309 GENERALIZED EPILEPSY (MULTI): ICD-10-CM

## 2024-07-10 DIAGNOSIS — F91.9 DISRUPTIVE BEHAVIOR DISORDER: ICD-10-CM

## 2024-07-10 DIAGNOSIS — F84.0 AUTISM DISORDER (HHS-HCC): ICD-10-CM

## 2024-07-10 PROCEDURE — 1036F TOBACCO NON-USER: CPT | Performed by: PSYCHIATRY & NEUROLOGY

## 2024-07-10 PROCEDURE — 99214 OFFICE O/P EST MOD 30 MIN: CPT | Performed by: PSYCHIATRY & NEUROLOGY

## 2024-07-10 RX ORDER — PROPRANOLOL HYDROCHLORIDE 40 MG/1
40 TABLET ORAL 2 TIMES DAILY
Qty: 180 TABLET | Refills: 3 | Status: SHIPPED | OUTPATIENT
Start: 2024-07-10

## 2024-07-10 RX ORDER — ZIPRASIDONE HYDROCHLORIDE 40 MG/1
CAPSULE ORAL
Qty: 180 CAPSULE | Refills: 3 | Status: SHIPPED | OUTPATIENT
Start: 2024-07-10

## 2024-07-10 RX ORDER — HYDROXYZINE HYDROCHLORIDE 25 MG/1
TABLET, FILM COATED ORAL
Qty: 270 TABLET | Refills: 3 | Status: SHIPPED | OUTPATIENT
Start: 2024-07-10

## 2024-07-10 RX ORDER — CLONIDINE HYDROCHLORIDE 0.2 MG/1
0.2 TABLET ORAL NIGHTLY
Qty: 90 TABLET | Refills: 3 | Status: SHIPPED | OUTPATIENT
Start: 2024-07-10

## 2024-07-10 RX ORDER — ZIPRASIDONE HYDROCHLORIDE 60 MG/1
60 CAPSULE ORAL
Qty: 90 CAPSULE | Refills: 3 | Status: SHIPPED | OUTPATIENT
Start: 2024-07-10

## 2024-07-10 RX ORDER — CLONIDINE HYDROCHLORIDE 0.1 MG/1
TABLET ORAL
Qty: 180 TABLET | Refills: 3 | Status: SHIPPED | OUTPATIENT
Start: 2024-07-10

## 2024-09-09 ENCOUNTER — APPOINTMENT (OUTPATIENT)
Dept: NEUROLOGY | Facility: CLINIC | Age: 24
End: 2024-09-09
Payer: COMMERCIAL

## 2024-09-09 DIAGNOSIS — G40.309 GENERALIZED EPILEPSY (MULTI): Primary | ICD-10-CM

## 2024-09-09 DIAGNOSIS — R74.01 ELEVATED ALT MEASUREMENT: ICD-10-CM

## 2024-09-09 DIAGNOSIS — F91.9 DISRUPTIVE BEHAVIOR DISORDER: ICD-10-CM

## 2024-09-09 DIAGNOSIS — F63.9 IMPULSE CONTROL DISORDER: ICD-10-CM

## 2024-09-09 DIAGNOSIS — F84.0 AUTISM SPECTRUM DISORDER (HHS-HCC): ICD-10-CM

## 2024-09-09 PROCEDURE — 99215 OFFICE O/P EST HI 40 MIN: CPT | Performed by: PSYCHIATRY & NEUROLOGY

## 2024-09-09 PROCEDURE — 1036F TOBACCO NON-USER: CPT | Performed by: PSYCHIATRY & NEUROLOGY

## 2024-09-09 ASSESSMENT — ENCOUNTER SYMPTOMS: SEIZURES: 1

## 2024-09-09 NOTE — PATIENT INSTRUCTIONS
The patient is doing well from a neurological standpoint.  The patient is compliant with Lamictal 25 mg tabs and he takes 9 of those pills twice a day.  The patient should continue his clonazepam.  The patient needs a CBC and liver function test done every 6 months while on this medicine.  The patient needs to get at least 8 hours of sleep a night.  The patient should continue to follow-up with psychiatry and continue his psych meds.  His psychiatrist is refilling his clonazepam.  I discussed all these issues in detail with the patient and his mother and answered all their questions.  The patient will follow up with me in 6 months.
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
## 2024-09-25 ENCOUNTER — APPOINTMENT (OUTPATIENT)
Dept: BEHAVIORAL HEALTH | Facility: CLINIC | Age: 24
End: 2024-09-25
Payer: COMMERCIAL

## 2024-09-25 DIAGNOSIS — F84.0 AUTISM SPECTRUM DISORDER (HHS-HCC): ICD-10-CM

## 2024-09-25 DIAGNOSIS — F63.9 IMPULSE CONTROL DISORDER: ICD-10-CM

## 2024-09-25 DIAGNOSIS — F91.9 DISRUPTIVE BEHAVIOR DISORDER: Primary | ICD-10-CM

## 2024-09-25 PROCEDURE — 1036F TOBACCO NON-USER: CPT | Performed by: PSYCHIATRY & NEUROLOGY

## 2024-09-25 PROCEDURE — 99214 OFFICE O/P EST MOD 30 MIN: CPT | Performed by: PSYCHIATRY & NEUROLOGY

## 2024-09-25 ASSESSMENT — ENCOUNTER SYMPTOMS
MYALGIAS: 0
CONSTIPATION: 0
TROUBLE SWALLOWING: 0
ABDOMINAL PAIN: 0
TREMORS: 0
VOMITING: 0
SEIZURES: 0
SHORTNESS OF BREATH: 0
SLEEP DISTURBANCE: 0

## 2024-09-25 NOTE — PROGRESS NOTES
Outpatient Adult IDD Psychiatry    A HIPAA-compliant interactive audio and video telecommunication system which permits real time communications between the patient (at the originating site) and provider (at the distant site) was utilized to provide this telehealth service.     The patient, family, caregivers and guardian (as appropriate) have provided consent on this date to conduct treatment via this telehealth service.  The patient's identity and physical location were verified at the time of this visit.      Present for appointment: Tyler and parents (Yosef and Chika).    SUBJECTIVE    No new or acute health concerns for Tyler since last visit.  He will be due to see his PCP for routine follow-up in 2-3 weeks.    Parents note that he's been somewhat more anxious and inflexible about adherence to expected routines/schedules lately.  They've had a harder time redirecting him when out in public as he is often intrusive into others' personal space.  He had one episode of getting aggressive with mom lately for no apparent reason; this happened in the evening while dad was at home, which is quite uncharacteristic of him.    Parents seem rather frustrated lately about a perceived lack of assistance from the [facility].  There has been no progress regarding regarding a day program.  His independent provider is often only working with him one day per week.     His sleep patterns are mostly unchanged and he still naps almost daily for a few hours each morning.    Weight and appetite are generally unchanged.  No other GI problems noted.      He does not appear to be having any EPS or TD.      He has not had any seizures in the interim.  He had a virtual follow-up visit with neurology at the beginning of September.    Review of Systems   HENT:  Negative for drooling and trouble swallowing.    Respiratory:  Negative for shortness of breath.    Cardiovascular:  Negative for chest pain.   Gastrointestinal:  Negative for  abdominal pain, constipation and vomiting.   Genitourinary:  Negative for enuresis.   Musculoskeletal:  Negative for gait problem and myalgias.   Neurological:  Negative for tremors, seizures and syncope.   Psychiatric/Behavioral:  Negative for sleep disturbance.       Controlled Substance Evaluation  OARRS/PDMP reviewed: Faisal Mcadams MD on 9/25/2024  7:02 PM  Is the patient prescribed a combination of a benzodiazepine and opioid? No  I have personally reviewed the OARRS report for Tyler Salinas.   I have considered the risks of abuse, dependence, addiction and diversion.    I believe that it is clinically appropriate for Tyler Salinas to be prescribed this medication.    Last Urine Drug Screen:  Recent Results (from the past 8760 hour(s))   Confirmation Opiate/Opioid/Benzo Prescription Compliance    Collection Time: 05/20/24  7:46 AM   Result Value Ref Range    Clonazepam <25 <25 ng/mL    7-Aminoclonazepam 114 (H) <25 ng/mL    Alprazolam <25 <25 ng/mL    Alpha-Hydroxyalprazolam <25 <25 ng/mL    Midazolam <25 <25 ng/mL    Alpha-Hydroxymidazolam <25 <25 ng/mL    Chlordiazepoxide <25 <25 ng/mL    Diazepam <25 <25 ng/mL    Nordiazepam <25 <25 ng/mL    Temazepam <25 <25 ng/mL    Oxazepam <25 <25 ng/mL    Lorazepam <25 <25 ng/mL    Methadone <25 <25 ng/mL    EDDP <25 <25 ng/mL    6-Acetylmorphine <25 <25 ng/mL    Codeine <50 <50 ng/mL    Hydrocodone <25 <25 ng/mL    Hydromorphone <25 <25 ng/mL    Morphine  <50 <50 ng/mL    Norhydrocodone <25 <25 ng/mL    Noroxycodone <25 <25 ng/mL    Oxycodone <25 <25 ng/mL    Oxymorphone <25 <25 ng/mL    Fentanyl <2.5 <2.5 ng/mL    Norfentanyl <2.5 <2.5 ng/mL    Tramadol <50 <50 ng/mL    O-Desmethyltramadol <50 <50 ng/mL    Zolpidem <25 <25 ng/mL    Zolpidem Metabolite (ZCA) <25 <25 ng/mL   Screen Opiate/Opioid/Benzo Prescription Compliance    Collection Time: 05/20/24  7:46 AM   Result Value Ref Range    Creatinine, Urine Random 149.2 20.0 - 370.0 mg/dL    Amphetamine  Screen, Urine Presumptive Negative Presumptive Negative    Barbiturate Screen, Urine Presumptive Negative Presumptive Negative    Cannabinoid Screen, Urine Presumptive Negative Presumptive Negative    Cocaine Metabolite Screen, Urine Presumptive Negative Presumptive Negative    PCP Screen, Urine Presumptive Negative Presumptive Negative     Controlled Substance Agreement: 03/06/2024  Reviewed Controlled Substance Agreement, including but not limited to:  Benefits, risks, and alternatives to treatment with a controlled substance medication(s).    Prescribed Controlled Substances:  > Benzodiazepines: Clonazepam  What is the patient's goal of therapy? Sleep/agitation  Is this being achieved with current treatment? Somewhat  Activities of Daily Living:   Is your overall impression that this patient is benefiting (symptom reduction outweighs side effects) from benzodiazepine therapy? Yes   1. Physical Functioning: Same  2. Family Relationship: Same  3. Social Relationship: Same  4. Mood: Same  5. Sleep Patterns: Same  6. Overall Function: Same     MEDICATION HISTORY  Aripiprazole  Fluoxetine  Quetiapine  Risperidone    CURRENT MEDICATIONS    Current Outpatient Medications:     chlorhexidine (Hibiclens) 4 % external liquid, USE AS DIRECTED. 20 ml mixed into bath soak for 2 hours two times per week, Disp: , Rfl:     clonazePAM (KlonoPIN) 1 mg tablet, Take 1/4 to 1/2 tablet daily at bedtime as needed for sleep., Disp: 30 tablet, Rfl: 1    clonazePAM (KlonoPIN) 2 mg disintegrating tablet, Take by mouth., Disp: , Rfl:     cloNIDine (Catapres) 0.1 mg tablet, Take 1 tablet (0.1 mg) by mouth twice daily - morning and 2pm., Disp: 180 tablet, Rfl: 3    cloNIDine (Catapres) 0.2 mg tablet, Take 1 tablet (0.2 mg) by mouth once daily at bedtime., Disp: 90 tablet, Rfl: 3    fluticasone (Flonase) 50 mcg/actuation nasal spray, Administer 2 sprays into affected nostril(s) once daily., Disp: , Rfl:     hydrOXYzine HCL (Atarax) 25 mg  tablet, Take 1 tablet (25 mg) by mouth at 2pm and 2 tablets (50 mg) by mouth at bedtime., Disp: 270 tablet, Rfl: 3    ketoconazole (NIZOral) 2 % cream, Apply topically 2 times a day., Disp: 60 g, Rfl: 2    lamoTRIgine (LaMICtal) 25 mg chewable tablet, Take 9 tablets (225 mg) by mouth 2 times a day., Disp: 540 tablet, Rfl: 5    multivitamin tablet, Take 1 tablet by mouth once daily., Disp: , Rfl:     propranolol (Inderal) 40 mg tablet, Take 1 tablet (40 mg) by mouth 2 times a day., Disp: 180 tablet, Rfl: 3    ziprasidone (Geodon) 40 mg capsule, Take 1 capsule (40 mg) by mouth twice daily with food - 2pm and bedtime., Disp: 180 capsule, Rfl: 3    ziprasidone (Geodon) 60 mg capsule, Take 1 capsule (60 mg) by mouth once daily with breakfast., Disp: 90 capsule, Rfl: 3     SOCIAL HISTORY  Living situation Lives with parents   Provider agency N/A   Work or day program None currently   School [employer] - graduated 2021   Guardianship Mother (Chika)   [facility] Specialist N/A   Nicotine None/never   Alcohol None/never   Other drugs None/never     OBJECTIVE    Lab Results   Component Value Date    HGB 15.7 05/20/2024     05/20/2024    NEUTROABS 5.14 05/20/2024    GLUCOSE 93 05/20/2024     05/20/2024    K 4.5 05/20/2024    CO2 29 05/20/2024    CALCIUM 9.4 05/20/2024    CREATININE 0.85 05/20/2024    AST 36 05/20/2024    ALT 76 (H) 05/20/2024    HGBA1C 6.0 (H) 05/20/2024    TSH 1.89 05/20/2024    FREET4 1.27 05/20/2024    CHOL 188 05/20/2024    LDLF 124 (H) 06/01/2023    TRIG 160 (H) 05/20/2024    VITD25 30 01/29/2020     Therapeutic Drug Monitoring  10/19/2023: Lamotrigine level = 9.2 [2.5-15] (450mg/day)  08/19/2020: Lamotrigine level = 5.0 [2.5-15] (450mg/day)    Electrocardiograms  04/22/2019: Sinus tachycardia, , QTc 432    MENTAL STATUS EXAM  General/Appearance: Appropriate dress/hygiene/grooming. Wearing matching t-shirt with dad.  Attitude/Behavior: Difficult to engage.  Poor/limited eye contact.  Speech/Communication: Scripting. Asks questions.  Motor: No abnormal or involuntary movements observed.  Gait: Not assessed at this visit.  Mood:  Anxious.  Affect: Anxious. Congruent.  Thought processes: Perseverative.  Thought content: Unable to assess.  Perception: Does not appear to be experiencing or responding to hallucinations.  Sensorium/Cognition: Oriented to self and surroundings. Intellectual impairment. Distracted. Minimal interest in participating.  Memory: Recent & remote recall is intact.  Insight: Minimal.  Judgment: Fair. Redirectable.     ASSESSMENT  Tyler continues to have some problems with anxiety and impulse control.  Getting him back into a structured routine of attending a community day program would likely be a helpful addition to his weekly schedule.  I will plan to reach out to his SSA in [facility] to see if there is more we could be doing to facilitate a return to day program.  Next appointment will be in-person for controlled substance prescribing.    PROBLEM LIST  ASD  Impulse control disorder  Disruptive behaviors  Epilepsy    PLAN  -- Continue ziprasidone 60mg - 40mg - 40mg (AM, 14:00, 19:00) for aggression and impulse control  -- Continue clonidine 0.1mg - 0.1mg - 0.2mg (AM, 14:00, 19:00) for impulse control  -- Continue propranolol 40mg BID (AM and 14:00) for impulse control  -- Continue hydroxyzine 25mg at 14:00 and 50mg at 19:00 for anxiety  -- Continue clonazepam 0.125mg to 0.25mg at bedtime for anxiety and sleep  -- Follow up 2-3 months    Faisal Mcadams MD    Prep time on date of the patient encounter: 5 minutes   Time spent directly with patient/family/caregiver: 30 minutes   Additional time spent on patient care activities: 0 minutes   Documentation time: 5 minutes   Other time spent: 0 minutes   Total time on date of patient encounter: 40 minutes

## 2024-10-16 ENCOUNTER — APPOINTMENT (OUTPATIENT)
Dept: PRIMARY CARE | Facility: CLINIC | Age: 24
End: 2024-10-16
Payer: COMMERCIAL

## 2024-10-16 VITALS
WEIGHT: 298 LBS | BODY MASS INDEX: 46.77 KG/M2 | HEIGHT: 67 IN | SYSTOLIC BLOOD PRESSURE: 120 MMHG | DIASTOLIC BLOOD PRESSURE: 87 MMHG

## 2024-10-16 DIAGNOSIS — Z00.00 HEALTH CARE MAINTENANCE: ICD-10-CM

## 2024-10-16 DIAGNOSIS — L21.9 SEBORRHEIC DERMATITIS: Primary | ICD-10-CM

## 2024-10-16 PROCEDURE — 90656 IIV3 VACC NO PRSV 0.5 ML IM: CPT | Performed by: INTERNAL MEDICINE

## 2024-10-16 PROCEDURE — 1036F TOBACCO NON-USER: CPT | Performed by: INTERNAL MEDICINE

## 2024-10-16 PROCEDURE — 3008F BODY MASS INDEX DOCD: CPT | Performed by: INTERNAL MEDICINE

## 2024-10-16 PROCEDURE — 99214 OFFICE O/P EST MOD 30 MIN: CPT | Performed by: INTERNAL MEDICINE

## 2024-10-16 PROCEDURE — 90471 IMMUNIZATION ADMIN: CPT | Performed by: INTERNAL MEDICINE
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
## 2024-11-07 ENCOUNTER — TELEPHONE (OUTPATIENT)
Dept: NEUROLOGY | Facility: CLINIC | Age: 24
End: 2024-11-07
Payer: COMMERCIAL

## 2024-11-07 NOTE — TELEPHONE ENCOUNTER
I completed prior authorization for pt's lamotrigine 25mg chewable tablets 9 tabs bid. Pt's insurance is denying this as they want pt to take the regular tablets.  Pt cannot tolerate the regular tablets, only chewable tablets.  Would you like to write an appeal letter?

## 2024-11-13 ENCOUNTER — APPOINTMENT (OUTPATIENT)
Dept: BEHAVIORAL HEALTH | Facility: CLINIC | Age: 24
End: 2024-11-13
Payer: COMMERCIAL

## 2024-11-13 DIAGNOSIS — G40.309 GENERALIZED EPILEPSY (MULTI): ICD-10-CM

## 2024-11-14 RX ORDER — LAMOTRIGINE 25 MG/1
225 TABLET, CHEWABLE ORAL 2 TIMES DAILY
Qty: 540 TABLET | Refills: 5 | Status: SHIPPED | OUTPATIENT
Start: 2024-11-14

## 2024-11-26 DIAGNOSIS — F63.9 IMPULSE CONTROL DISORDER: ICD-10-CM

## 2024-11-27 RX ORDER — CLONAZEPAM 1 MG/1
TABLET ORAL
Qty: 15 TABLET | Refills: 1 | Status: SHIPPED | OUTPATIENT
Start: 2024-11-27

## 2025-01-02 ENCOUNTER — APPOINTMENT (OUTPATIENT)
Dept: BEHAVIORAL HEALTH | Facility: CLINIC | Age: 25
End: 2025-01-02
Payer: COMMERCIAL

## 2025-01-02 VITALS
RESPIRATION RATE: 18 BRPM | TEMPERATURE: 97.9 F | WEIGHT: 300.2 LBS | DIASTOLIC BLOOD PRESSURE: 87 MMHG | BODY MASS INDEX: 47.02 KG/M2 | HEART RATE: 89 BPM | SYSTOLIC BLOOD PRESSURE: 128 MMHG

## 2025-01-02 DIAGNOSIS — F63.9 IMPULSE CONTROL DISORDER: Primary | ICD-10-CM

## 2025-01-02 DIAGNOSIS — F91.9 DISRUPTIVE BEHAVIOR DISORDER: ICD-10-CM

## 2025-01-02 DIAGNOSIS — F84.0 AUTISM SPECTRUM DISORDER (HHS-HCC): ICD-10-CM

## 2025-01-02 PROCEDURE — 99215 OFFICE O/P EST HI 40 MIN: CPT | Performed by: PSYCHIATRY & NEUROLOGY

## 2025-01-02 PROCEDURE — 1036F TOBACCO NON-USER: CPT | Performed by: PSYCHIATRY & NEUROLOGY

## 2025-01-02 RX ORDER — CLONAZEPAM 1 MG/1
TABLET ORAL
Qty: 15 TABLET | Refills: 1 | Status: SHIPPED | OUTPATIENT
Start: 2025-01-02

## 2025-01-02 ASSESSMENT — ABNORMAL INVOLUNTARY MOVEMENT SCALE (AIMS)
FACIAL_EXPRESSION_MUSCLES: NONE, NORMAL
JAW: NONE, NORMAL
AIMS_PATIENT_INCAPACITATION: NONE, NORMAL
AIMS_SEVERITY: 0
TONGUE: NONE, NORMAL
LOWER_BODY_EXTREMITIES: NONE, NORMAL
NECK_SHOULDER_HIPS: NONE, NORMAL
UPPER_BODY_EXTREMITIES: NONE, NORMAL
LIPS_PARIETAL: NONE, NORMAL

## 2025-01-02 ASSESSMENT — PAIN SCALES - GENERAL: PAINLEVEL_OUTOF10: 0-NO PAIN

## 2025-01-02 ASSESSMENT — ENCOUNTER SYMPTOMS
SEIZURES: 0
CONSTIPATION: 0
MYALGIAS: 0
SHORTNESS OF BREATH: 0
ABDOMINAL PAIN: 0
VOMITING: 0
TROUBLE SWALLOWING: 0
TREMORS: 0
SLEEP DISTURBANCE: 0

## 2025-01-02 NOTE — PROGRESS NOTES
Outpatient Adult IDD Psychiatry    Present for appointment: Tyler and parents (Yosef and Chika).    SUBJECTIVE    No new or acute health concerns for Tyler since last visit.      No significant changes at home.  Working with [facility] to find new independent provider who can work with Tyler on a more consistent basis.    No updates on any prospective day hab programs at this time.    Still tends to quickly escalate to anger/aggression when impatient or frustrated.    No noted changes in repetitive/scripted questions.    Some recent increase in instances of engaging in sexual self-stimulation outside of his bedroom at home, though never any similar behaviors when out in public.    His sleep patterns are mostly unchanged and he still naps almost daily for a few hours (about 2-2.5 hours) each morning.  Usually into bed and asleep by 21:00, but parents aren't sure how much or how often he may be waking up during the night.    Weight and appetite are generally unchanged.  No other GI problems noted.      He does not appear to be having any EPS or TD.  AIMS exam is negative.    He has not had any seizures in the interim.  No changes to ASM regimen.    Review of Systems   HENT:  Negative for drooling and trouble swallowing.    Respiratory:  Negative for shortness of breath.    Cardiovascular:  Negative for chest pain.   Gastrointestinal:  Negative for abdominal pain, constipation and vomiting.   Genitourinary:  Negative for enuresis.   Musculoskeletal:  Negative for gait problem and myalgias.   Neurological:  Negative for tremors, seizures and syncope.   Psychiatric/Behavioral:  Negative for sleep disturbance.       Controlled Substance Evaluation  OARRS/PDMP reviewed: Faisal Mcadams MD on 1/2/2025  9:07 AM  Is the patient prescribed a combination of a benzodiazepine and opioid? No  I have personally reviewed the OARRS report for Tyler Sailnas.   I have considered the risks of abuse, dependence, addiction and  diversion.    I believe that it is clinically appropriate for Tyler Salinas to be prescribed this medication.    Last Urine Drug Screen:  Recent Results (from the past 8760 hours)   Confirmation Opiate/Opioid/Benzo Prescription Compliance    Collection Time: 05/20/24  7:46 AM   Result Value Ref Range    Clonazepam <25 <25 ng/mL    7-Aminoclonazepam 114 (H) <25 ng/mL    Alprazolam <25 <25 ng/mL    Alpha-Hydroxyalprazolam <25 <25 ng/mL    Midazolam <25 <25 ng/mL    Alpha-Hydroxymidazolam <25 <25 ng/mL    Chlordiazepoxide <25 <25 ng/mL    Diazepam <25 <25 ng/mL    Nordiazepam <25 <25 ng/mL    Temazepam <25 <25 ng/mL    Oxazepam <25 <25 ng/mL    Lorazepam <25 <25 ng/mL    Methadone <25 <25 ng/mL    EDDP <25 <25 ng/mL    6-Acetylmorphine <25 <25 ng/mL    Codeine <50 <50 ng/mL    Hydrocodone <25 <25 ng/mL    Hydromorphone <25 <25 ng/mL    Morphine  <50 <50 ng/mL    Norhydrocodone <25 <25 ng/mL    Noroxycodone <25 <25 ng/mL    Oxycodone <25 <25 ng/mL    Oxymorphone <25 <25 ng/mL    Fentanyl <2.5 <2.5 ng/mL    Norfentanyl <2.5 <2.5 ng/mL    Tramadol <50 <50 ng/mL    O-Desmethyltramadol <50 <50 ng/mL    Zolpidem <25 <25 ng/mL    Zolpidem Metabolite (ZCA) <25 <25 ng/mL   Screen Opiate/Opioid/Benzo Prescription Compliance    Collection Time: 05/20/24  7:46 AM   Result Value Ref Range    Creatinine, Urine Random 149.2 20.0 - 370.0 mg/dL    Amphetamine Screen, Urine Presumptive Negative Presumptive Negative    Barbiturate Screen, Urine Presumptive Negative Presumptive Negative    Cannabinoid Screen, Urine Presumptive Negative Presumptive Negative    Cocaine Metabolite Screen, Urine Presumptive Negative Presumptive Negative    PCP Screen, Urine Presumptive Negative Presumptive Negative     Controlled Substance Agreement: 01/02/2025  Reviewed Controlled Substance Agreement, including but not limited to:  Benefits, risks, and alternatives to treatment with a controlled substance medication(s).    Prescribed Controlled  Substances:  > Benzodiazepines: Clonazepam  What is the patient's goal of therapy? Sleep/agitation  Is this being achieved with current treatment? Somewhat  Activities of Daily Living:   Is your overall impression that this patient is benefiting (symptom reduction outweighs side effects) from benzodiazepine therapy? Yes   1. Physical Functioning: Same  2. Family Relationship: Same  3. Social Relationship: Same  4. Mood: Same  5. Sleep Patterns: Same  6. Overall Function: Same     MEDICATION HISTORY  Aripiprazole  Fluoxetine  Quetiapine  Risperidone    CURRENT MEDICATIONS    Current Outpatient Medications:     chlorhexidine (Hibiclens) 4 % external liquid, USE AS DIRECTED. 20 ml mixed into bath soak for 2 hours two times per week, Disp: , Rfl:     clonazePAM (KlonoPIN) 1 mg tablet, TAKE 1/4 TO 1/2 TABLET DAILY AT BEDTIME AS NEEDED FOR SLEEP., Disp: 15 tablet, Rfl: 1    clonazePAM (KlonoPIN) 2 mg disintegrating tablet, Take by mouth., Disp: , Rfl:     cloNIDine (Catapres) 0.1 mg tablet, Take 1 tablet (0.1 mg) by mouth twice daily - morning and 2pm., Disp: 180 tablet, Rfl: 3    cloNIDine (Catapres) 0.2 mg tablet, Take 1 tablet (0.2 mg) by mouth once daily at bedtime., Disp: 90 tablet, Rfl: 3    fluticasone (Flonase) 50 mcg/actuation nasal spray, Administer 2 sprays into affected nostril(s) once daily., Disp: , Rfl:     hydrOXYzine HCL (Atarax) 25 mg tablet, Take 1 tablet (25 mg) by mouth at 2pm and 2 tablets (50 mg) by mouth at bedtime., Disp: 270 tablet, Rfl: 3    ketoconazole (NIZOral) 2 % cream, Apply topically 2 times a day., Disp: 60 g, Rfl: 2    lamoTRIgine (LaMICtal) 25 mg chewable tablet, Take 9 tablets (225 mg) by mouth 2 times a day., Disp: 540 tablet, Rfl: 5    multivitamin tablet, Take 1 tablet by mouth once daily., Disp: , Rfl:     propranolol (Inderal) 40 mg tablet, Take 1 tablet (40 mg) by mouth 2 times a day., Disp: 180 tablet, Rfl: 3    ziprasidone (Geodon) 40 mg capsule, Take 1 capsule (40 mg) by  mouth twice daily with food - 2pm and bedtime., Disp: 180 capsule, Rfl: 3    ziprasidone (Geodon) 60 mg capsule, Take 1 capsule (60 mg) by mouth once daily with breakfast., Disp: 90 capsule, Rfl: 3     SOCIAL HISTORY  Living situation Lives with parents   Provider agency N/A   Work or day program None currently   School [employer] - graduated 2021   Guardianship Mother (Chika)   [facility] Specialist N/A   Nicotine None/never   Alcohol None/never   Other drugs None/never     OBJECTIVE    Visit Vitals  /87 (BP Location: Right arm, Patient Position: Sitting, BP Cuff Size: Adult)   Pulse 89   Temp 36.6 °C (97.9 °F) (Temporal)   Resp 18   Wt 136 kg (300 lb 3.2 oz)   BMI 47.02 kg/m²   Smoking Status Never   BSA 2.54 m²     Lab Results   Component Value Date    HGB 15.7 05/20/2024     05/20/2024    NEUTROABS 5.14 05/20/2024    GLUCOSE 93 05/20/2024     05/20/2024    K 4.5 05/20/2024    CO2 29 05/20/2024    CALCIUM 9.4 05/20/2024    CREATININE 0.85 05/20/2024    AST 36 05/20/2024    ALT 76 (H) 05/20/2024    HGBA1C 6.0 (H) 05/20/2024    TSH 1.89 05/20/2024    FREET4 1.27 05/20/2024    CHOL 188 05/20/2024    LDLF 124 (H) 06/01/2023    TRIG 160 (H) 05/20/2024    VITD25 30 01/29/2020     Therapeutic Drug Monitoring  10/19/2023: Lamotrigine level = 9.2 [2.5-15] (450mg/day)  08/19/2020: Lamotrigine level = 5.0 [2.5-15] (450mg/day)    Electrocardiograms  04/22/2019: Sinus tachycardia, , QTc 432    MENTAL STATUS EXAM  General/Appearance:  Wearing shorts in cold winter weather.  Attitude/Behavior: Difficult to engage. Poor/limited eye contact. Tries to touch dad repeatedly.  Speech/Communication: Scripting. Asks questions.  Motor: No abnormal or involuntary movements observed.  Gait: Ambulates w/o difficulty. Stable/steady.  Mood:  Anxious.  Affect: Anxious. Congruent.  Thought processes: Perseverative.  Thought content: Unable to assess.  Perception: Does not appear to be  experiencing or responding to hallucinations.  Sensorium/Cognition: Oriented to self and surroundings. Intellectual impairment. Distracted. Minimal interest in participating.  Memory: Recent & remote recall is intact.  Insight: Minimal.  Judgment: Fair. Redirectable.     ASSESSMENT  Higher doses of ziprasidone may lead to increased sedation.  We may want to consider cross-over to an alternative treatment option.  Sample collected for Genes[employer] testing to help inform subsequent medication changes.  Will discuss next steps regarding medications with parents once we have those results.    PROBLEM LIST  ASD  Impulse control disorder  Disruptive behaviors  Epilepsy    PLAN  -- Continue ziprasidone 60mg - 40mg - 40mg (AM, 14:00, 19:00) for aggression and impulse control  -- Continue clonidine 0.1mg - 0.1mg - 0.2mg (AM, 14:00, 19:00) for impulse control  -- Continue propranolol 40mg BID (AM and 14:00) for impulse control  -- Continue hydroxyzine 25mg at 14:00 and 50mg at 19:00 for anxiety  -- Continue clonazepam 0.125mg to 0.25mg at bedtime for anxiety and sleep  -- Follow up 2 months    Faisal Mcadams MD    Prep time on date of the patient encounter: 5 minutes   Time spent directly with patient/family/caregiver: 35 minutes   Additional time spent on patient care activities: 0 minutes   Documentation time: 5 minutes   Other time spent: 0 minutes   Total time on date of patient encounter: 45 minutes

## 2025-02-25 ASSESSMENT — ENCOUNTER SYMPTOMS
SEIZURES: 0
TREMORS: 0
ABDOMINAL PAIN: 0
SHORTNESS OF BREATH: 0
SLEEP DISTURBANCE: 0
CONSTIPATION: 0
VOMITING: 0
MYALGIAS: 0
TROUBLE SWALLOWING: 0

## 2025-02-25 NOTE — PROGRESS NOTES
Outpatient Adult IDD Psychiatry    A HIPAA-compliant interactive audio and video telecommunication system which permits real time communications between the patient (at the originating site) and provider (at the distant site) was utilized to provide this telehealth service.     The patient, family, caregivers and guardian (as appropriate) have provided consent to conduct treatment via this telehealth service.      The patient's identity and physical location were verified at the time of this visit.     Present for appointment: Tyler and parents (Yosef and Chika).    Appointment location: Home.  [facility] [place] [address]    SUBJECTIVE    Everyone at home just recently tested positive for COVID.  Tyler does not seem to have severe symptoms at this time.    He has a new provider (Freeman) that is coming over from 09:00 to 14:00 four days a week ([id number]).  Seems to be getting along well with Freeman.  Freeman is working with Tyler on completing chores and learning new jobs around the house.    No updates on any prospective day hab programs at this time.    Tyler hasn't had any major aggressive episodes or other behavior changes since reducing the dose of propranolol (currently at 20mg BID).    No noted changes in anxiety, obsessive thinking, or repetitive/scripted questions.    His sleep patterns are mostly unchanged and he still naps almost daily for a few hours each morning.    Weight and appetite are generally unchanged.  No other GI problems noted.      He does not appear to be having any EPS or TD.  AIMS exam is negative.    He has not had any seizures in the interim.  No changes to ASM regimen.    Review of Systems   HENT:  Negative for drooling and trouble swallowing.    Respiratory:  Negative for shortness of breath.    Cardiovascular:  Negative for chest pain.   Gastrointestinal:  Negative for abdominal pain, constipation and vomiting.   Genitourinary:  Negative for  enuresis.   Musculoskeletal:  Negative for gait problem and myalgias.   Neurological:  Negative for tremors, seizures and syncope.   Psychiatric/Behavioral:  Negative for sleep disturbance.       Controlled Substance Evaluation  OARRS/PDMP reviewed: Faisal Mcadams MD on 2/25/2025  1:16 PM  Is the patient prescribed a combination of a benzodiazepine and opioid? No  I have personally reviewed the OARRS report for Tyler Salinas.   I have considered the risks of abuse, dependence, addiction and diversion.    I believe that it is clinically appropriate for Tyler Salinas to be prescribed this medication.    Last Urine Drug Screen:  Recent Results (from the past 8760 hours)   Confirmation Opiate/Opioid/Benzo Prescription Compliance    Collection Time: 05/20/24  7:46 AM   Result Value Ref Range    Clonazepam <25 <25 ng/mL    7-Aminoclonazepam 114 (H) <25 ng/mL    Alprazolam <25 <25 ng/mL    Alpha-Hydroxyalprazolam <25 <25 ng/mL    Midazolam <25 <25 ng/mL    Alpha-Hydroxymidazolam <25 <25 ng/mL    Chlordiazepoxide <25 <25 ng/mL    Diazepam <25 <25 ng/mL    Nordiazepam <25 <25 ng/mL    Temazepam <25 <25 ng/mL    Oxazepam <25 <25 ng/mL    Lorazepam <25 <25 ng/mL    Methadone <25 <25 ng/mL    EDDP <25 <25 ng/mL    6-Acetylmorphine <25 <25 ng/mL    Codeine <50 <50 ng/mL    Hydrocodone <25 <25 ng/mL    Hydromorphone <25 <25 ng/mL    Morphine  <50 <50 ng/mL    Norhydrocodone <25 <25 ng/mL    Noroxycodone <25 <25 ng/mL    Oxycodone <25 <25 ng/mL    Oxymorphone <25 <25 ng/mL    Fentanyl <2.5 <2.5 ng/mL    Norfentanyl <2.5 <2.5 ng/mL    Tramadol <50 <50 ng/mL    O-Desmethyltramadol <50 <50 ng/mL    Zolpidem <25 <25 ng/mL    Zolpidem Metabolite (ZCA) <25 <25 ng/mL   Screen Opiate/Opioid/Benzo Prescription Compliance    Collection Time: 05/20/24  7:46 AM   Result Value Ref Range    Creatinine, Urine Random 149.2 20.0 - 370.0 mg/dL    Amphetamine Screen, Urine Presumptive Negative Presumptive Negative    Barbiturate Screen,  Urine Presumptive Negative Presumptive Negative    Cannabinoid Screen, Urine Presumptive Negative Presumptive Negative    Cocaine Metabolite Screen, Urine Presumptive Negative Presumptive Negative    PCP Screen, Urine Presumptive Negative Presumptive Negative     Controlled Substance Agreement: 01/02/2025  Reviewed Controlled Substance Agreement, including but not limited to:  Benefits, risks, and alternatives to treatment with a controlled substance medication(s).    Prescribed Controlled Substances:  > Benzodiazepines: Clonazepam  What is the patient's goal of therapy? Sleep/agitation  Is this being achieved with current treatment? Somewhat  Activities of Daily Living:   Is your overall impression that this patient is benefiting (symptom reduction outweighs side effects) from benzodiazepine therapy? Yes   1. Physical Functioning: Same  2. Family Relationship: Same  3. Social Relationship: Same  4. Mood: Same  5. Sleep Patterns: Same  6. Overall Function: Same     MEDICATION HISTORY  Aripiprazole  Fluoxetine  Quetiapine  Risperidone    CURRENT MEDICATIONS    Current Outpatient Medications:     chlorhexidine (Hibiclens) 4 % external liquid, USE AS DIRECTED. 20 ml mixed into bath soak for 2 hours two times per week, Disp: , Rfl:     clonazePAM (KlonoPIN) 1 mg tablet, Take 1/4 to 1/2 tablet daily at bedtime as needed for sleep., Disp: 15 tablet, Rfl: 1    clonazePAM (KlonoPIN) 2 mg disintegrating tablet, Take by mouth., Disp: , Rfl:     cloNIDine (Catapres) 0.1 mg tablet, Take 1 tablet (0.1 mg) by mouth twice daily - morning and 2pm., Disp: 180 tablet, Rfl: 3    cloNIDine (Catapres) 0.2 mg tablet, Take 1 tablet (0.2 mg) by mouth once daily at bedtime., Disp: 90 tablet, Rfl: 3    fluticasone (Flonase) 50 mcg/actuation nasal spray, Administer 2 sprays into affected nostril(s) once daily., Disp: , Rfl:     hydrOXYzine HCL (Atarax) 25 mg tablet, Take 1 tablet (25 mg) by mouth at 2pm and 2 tablets (50 mg) by mouth at  bedtime., Disp: 270 tablet, Rfl: 3    ketoconazole (NIZOral) 2 % cream, Apply topically 2 times a day., Disp: 60 g, Rfl: 2    lamoTRIgine (LaMICtal) 25 mg chewable tablet, Take 9 tablets (225 mg) by mouth 2 times a day., Disp: 540 tablet, Rfl: 5    multivitamin tablet, Take 1 tablet by mouth once daily., Disp: , Rfl:     propranolol (Inderal) 40 mg tablet, Take 1 tablet (40 mg) by mouth 2 times a day., Disp: 180 tablet, Rfl: 3    ziprasidone (Geodon) 40 mg capsule, Take 1 capsule (40 mg) by mouth twice daily with food - 2pm and bedtime., Disp: 180 capsule, Rfl: 3    ziprasidone (Geodon) 60 mg capsule, Take 1 capsule (60 mg) by mouth once daily with breakfast., Disp: 90 capsule, Rfl: 3     SOCIAL HISTORY  Living situation Lives with parents   Provider agency N/A   Work or day program None currently   School [employer] - graduated 2021   Guardianship Mother (Chika)   [facility] N/A   Nicotine None/never   Alcohol None/never   Other drugs None/never     OBJECTIVE    Lab Results   Component Value Date    HGB 15.7 05/20/2024     05/20/2024    NEUTROABS 5.14 05/20/2024    GLUCOSE 93 05/20/2024     05/20/2024    K 4.5 05/20/2024    CO2 29 05/20/2024    CALCIUM 9.4 05/20/2024    CREATININE 0.85 05/20/2024    AST 36 05/20/2024    ALT 76 (H) 05/20/2024    HGBA1C 6.0 (H) 05/20/2024    TSH 1.89 05/20/2024    FREET4 1.27 05/20/2024    CHOL 188 05/20/2024    LDLF 124 (H) 06/01/2023    TRIG 160 (H) 05/20/2024    VITD25 30 01/29/2020     Therapeutic Drug Monitoring  10/19/2023: Lamotrigine level = 9.2 [2.5-15] (450mg/day)  08/19/2020: Lamotrigine level = 5.0 [2.5-15] (450mg/day)    Electrocardiograms  04/22/2019: Sinus tachycardia, , QTc 432    MENTAL STATUS EXAM  General/Appearance: Appropriate dress/hygiene/grooming.  Attitude/Behavior: Difficult to engage. Poor/limited eye contact.  Speech/Communication: Scripting. Incessant chatter and self-talk.  Motor: No abnormal or  involuntary movements observed.  Gait: Not assessed at this visit.  Mood: Neutral.  Affect: Anxious. Congruent.  Thought processes: Perseverative.  Thought content:  Repetitive self-talk about seeing a movie or different things he may do on future dates or asks about days that dad might be off work next week.  Perception: Does not appear to be experiencing or responding to hallucinations.  Sensorium/Cognition: Oriented to self and surroundings. Intellectual impairment. Minimal interest in participating. Difficulty shifting between topics.  Memory: Recent & remote recall is intact.  Insight: Minimal.  Judgment: Fair. Redirectable.     ASSESSMENT  Based on pharmacogenomic test results, we will finish off propranolol taper.  Then might consider trial cross-over from ziprasidone to lurasidone.    PROBLEM LIST  ASD  Impulse control disorder  Disruptive behaviors  Epilepsy    PLAN  -- DECREASE propranolol to 10mg BID for two weeks then stop  -- Continue ziprasidone 60mg - 40mg - 40mg (AM, 14:00, 19:00) for aggression and impulse control  -- Continue clonidine 0.1mg - 0.1mg - 0.2mg (AM, 14:00, 19:00) for impulse control  -- Continue hydroxyzine 25mg at 14:00 and 50mg at 19:00 for anxiety  -- Continue clonazepam 0.125mg to 0.25mg at bedtime for anxiety and sleep  -- Follow up ~ 8 weeks    Faisal Mcadams MD

## 2025-02-26 ENCOUNTER — APPOINTMENT (OUTPATIENT)
Dept: BEHAVIORAL HEALTH | Facility: CLINIC | Age: 25
End: 2025-02-26
Payer: COMMERCIAL

## 2025-02-26 DIAGNOSIS — F84.0 AUTISM SPECTRUM DISORDER (HHS-HCC): ICD-10-CM

## 2025-02-26 DIAGNOSIS — F91.9 DISRUPTIVE BEHAVIOR DISORDER: ICD-10-CM

## 2025-02-26 DIAGNOSIS — F63.9 IMPULSE CONTROL DISORDER: Primary | ICD-10-CM

## 2025-02-26 PROCEDURE — 1036F TOBACCO NON-USER: CPT | Performed by: PSYCHIATRY & NEUROLOGY

## 2025-02-26 PROCEDURE — 99214 OFFICE O/P EST MOD 30 MIN: CPT | Performed by: PSYCHIATRY & NEUROLOGY

## 2025-02-26 RX ORDER — PROPRANOLOL HYDROCHLORIDE 10 MG/1
10 TABLET ORAL 2 TIMES DAILY
Qty: 60 TABLET | Refills: 0 | Status: SHIPPED | OUTPATIENT
Start: 2025-02-26

## 2025-03-13 LAB
ALBUMIN SERPL-MCNC: 4.1 G/DL (ref 3.6–5.1)
ALBUMIN/GLOB SERPL: 1.2 (CALC) (ref 1–2.5)
ALP SERPL-CCNC: 85 U/L (ref 36–130)
ALT SERPL-CCNC: 86 U/L (ref 9–46)
AST SERPL-CCNC: 38 U/L (ref 10–40)
BASOPHILS # BLD AUTO: 71 CELLS/UL (ref 0–200)
BASOPHILS NFR BLD AUTO: 0.7 %
BILIRUB DIRECT SERPL-MCNC: 0.1 MG/DL
BILIRUB INDIRECT SERPL-MCNC: 0.5 MG/DL (CALC) (ref 0.2–1.2)
BILIRUB SERPL-MCNC: 0.6 MG/DL (ref 0.2–1.2)
EOSINOPHIL # BLD AUTO: 357 CELLS/UL (ref 15–500)
EOSINOPHIL NFR BLD AUTO: 3.5 %
ERYTHROCYTE [DISTWIDTH] IN BLOOD BY AUTOMATED COUNT: 12.1 % (ref 11–15)
GLOBULIN SER CALC-MCNC: 3.3 G/DL (CALC) (ref 1.9–3.7)
HCT VFR BLD AUTO: 49.6 % (ref 38.5–50)
HGB BLD-MCNC: 16.6 G/DL (ref 13.2–17.1)
LYMPHOCYTES # BLD AUTO: 3652 CELLS/UL (ref 850–3900)
LYMPHOCYTES NFR BLD AUTO: 35.8 %
MCH RBC QN AUTO: 28.9 PG (ref 27–33)
MCHC RBC AUTO-ENTMCNC: 33.5 G/DL (ref 32–36)
MCV RBC AUTO: 86.3 FL (ref 80–100)
MONOCYTES # BLD AUTO: 979 CELLS/UL (ref 200–950)
MONOCYTES NFR BLD AUTO: 9.6 %
NEUTROPHILS # BLD AUTO: 5141 CELLS/UL (ref 1500–7800)
NEUTROPHILS NFR BLD AUTO: 50.4 %
PLATELET # BLD AUTO: 304 THOUSAND/UL (ref 140–400)
PMV BLD REES-ECKER: 9.9 FL (ref 7.5–12.5)
PROT SERPL-MCNC: 7.4 G/DL (ref 6.1–8.1)
RBC # BLD AUTO: 5.75 MILLION/UL (ref 4.2–5.8)
WBC # BLD AUTO: 10.2 THOUSAND/UL (ref 3.8–10.8)

## 2025-03-24 ENCOUNTER — APPOINTMENT (OUTPATIENT)
Dept: NEUROLOGY | Facility: CLINIC | Age: 25
End: 2025-03-24
Payer: COMMERCIAL

## 2025-03-24 VITALS — WEIGHT: 300 LBS | HEIGHT: 69 IN | BODY MASS INDEX: 44.43 KG/M2

## 2025-03-24 DIAGNOSIS — F63.9 IMPULSE CONTROL DISORDER: ICD-10-CM

## 2025-03-24 DIAGNOSIS — F91.9 DISRUPTIVE BEHAVIOR DISORDER: ICD-10-CM

## 2025-03-24 DIAGNOSIS — G40.309 GENERALIZED EPILEPSY (MULTI): Primary | ICD-10-CM

## 2025-03-24 DIAGNOSIS — F84.0 AUTISM SPECTRUM DISORDER (HHS-HCC): ICD-10-CM

## 2025-03-24 DIAGNOSIS — R74.01 ELEVATED ALT MEASUREMENT: ICD-10-CM

## 2025-03-24 PROCEDURE — 3008F BODY MASS INDEX DOCD: CPT | Performed by: PSYCHIATRY & NEUROLOGY

## 2025-03-24 PROCEDURE — G8433 SCR FOR DEP NOT CPT DOC RSN: HCPCS | Performed by: PSYCHIATRY & NEUROLOGY

## 2025-03-24 PROCEDURE — 1036F TOBACCO NON-USER: CPT | Performed by: PSYCHIATRY & NEUROLOGY

## 2025-03-24 PROCEDURE — 99215 OFFICE O/P EST HI 40 MIN: CPT | Performed by: PSYCHIATRY & NEUROLOGY

## 2025-03-24 ASSESSMENT — ENCOUNTER SYMPTOMS
DEPRESSION: 0
OCCASIONAL FEELINGS OF UNSTEADINESS: 0
LOSS OF SENSATION IN FEET: 0
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
## 2025-03-24 NOTE — PATIENT INSTRUCTIONS
The patient is doing well from a neurological standpoint.  The patient is compliant with Lamictal 25 mg tabs and he takes 9 of those pills twice a day.  The patient should continue his clonazepam.  The patient needs a CBC and liver function test done every 6 months while on this medicine.  The patient has a mildly elevated ALT needs follow-up with his primary care doctor for this.  The patient needs to get at least 8 hours of sleep a night.  The patient should continue to follow-up with psychiatry and continue his psych meds.  His psychiatrist is refilling his clonazepam.  I discussed all these issues in detail with the patient and his mother and answered all their questions.  The patient will follow up with me in 6 months.

## 2025-04-15 ASSESSMENT — ENCOUNTER SYMPTOMS
CONSTIPATION: 0
MYALGIAS: 0
SEIZURES: 0
TROUBLE SWALLOWING: 0
TREMORS: 0
SHORTNESS OF BREATH: 0
SLEEP DISTURBANCE: 0
VOMITING: 0
ABDOMINAL PAIN: 0
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
## 2025-04-16 ENCOUNTER — APPOINTMENT (OUTPATIENT)
Dept: PRIMARY CARE | Facility: CLINIC | Age: 25
End: 2025-04-16
Payer: COMMERCIAL

## 2025-04-16 ENCOUNTER — OFFICE VISIT (OUTPATIENT)
Dept: BEHAVIORAL HEALTH | Facility: CLINIC | Age: 25
End: 2025-04-16
Payer: COMMERCIAL

## 2025-04-16 DIAGNOSIS — F91.8 OTHER SPECIFIED DISRUPTIVE, IMPULSE CONTROL, AND CONDUCT DISORDER: Primary | ICD-10-CM

## 2025-04-16 DIAGNOSIS — F63.89 OTHER SPECIFIED DISRUPTIVE, IMPULSE CONTROL, AND CONDUCT DISORDER: Primary | ICD-10-CM

## 2025-04-16 DIAGNOSIS — F84.0 AUTISM SPECTRUM DISORDER (HHS-HCC): ICD-10-CM

## 2025-04-16 PROCEDURE — 99214 OFFICE O/P EST MOD 30 MIN: CPT | Performed by: PSYCHIATRY & NEUROLOGY

## 2025-04-16 PROCEDURE — 1036F TOBACCO NON-USER: CPT | Performed by: PSYCHIATRY & NEUROLOGY

## 2025-04-16 RX ORDER — LURASIDONE HYDROCHLORIDE 40 MG/1
40 TABLET, FILM COATED ORAL
Qty: 30 TABLET | Refills: 0 | Status: SHIPPED | OUTPATIENT
Start: 2025-04-16

## 2025-04-16 RX ORDER — CLONAZEPAM 1 MG/1
TABLET ORAL
Qty: 15 TABLET | Refills: 1 | Status: SHIPPED | OUTPATIENT
Start: 2025-04-16

## 2025-04-23 ENCOUNTER — APPOINTMENT (OUTPATIENT)
Dept: PRIMARY CARE | Facility: CLINIC | Age: 25
End: 2025-04-23
Payer: COMMERCIAL

## 2025-04-23 VITALS — WEIGHT: 302 LBS | DIASTOLIC BLOOD PRESSURE: 82 MMHG | SYSTOLIC BLOOD PRESSURE: 128 MMHG | BODY MASS INDEX: 44.6 KG/M2

## 2025-04-23 DIAGNOSIS — L01.02 FOLLICULITIS AND PERIFOLLICULITIS: ICD-10-CM

## 2025-04-23 DIAGNOSIS — Z00.00 HEALTHCARE MAINTENANCE: Primary | ICD-10-CM

## 2025-04-23 DIAGNOSIS — L73.9 FOLLICULITIS AND PERIFOLLICULITIS: ICD-10-CM

## 2025-04-23 DIAGNOSIS — R56.9 SEIZURES (MULTI): ICD-10-CM

## 2025-04-23 PROCEDURE — 1036F TOBACCO NON-USER: CPT | Performed by: INTERNAL MEDICINE

## 2025-04-23 PROCEDURE — 99214 OFFICE O/P EST MOD 30 MIN: CPT | Performed by: INTERNAL MEDICINE

## 2025-04-23 RX ORDER — KETOCONAZOLE 20 MG/G
CREAM TOPICAL 2 TIMES DAILY
Qty: 60 G | Refills: 2 | Status: SHIPPED | OUTPATIENT
Start: 2025-04-23
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
## 2025-06-02 DIAGNOSIS — G40.309 GENERALIZED EPILEPSY (MULTI): ICD-10-CM

## 2025-06-02 RX ORDER — LAMOTRIGINE 25 MG/1
225 TABLET, CHEWABLE ORAL 2 TIMES DAILY
Qty: 540 TABLET | Refills: 5 | Status: SHIPPED | OUTPATIENT
Start: 2025-06-02

## 2025-06-03 ASSESSMENT — ENCOUNTER SYMPTOMS
SLEEP DISTURBANCE: 0
ABDOMINAL PAIN: 0
SEIZURES: 0
MYALGIAS: 0
TROUBLE SWALLOWING: 0
SHORTNESS OF BREATH: 0
TREMORS: 0
VOMITING: 0
CONSTIPATION: 0

## 2025-06-03 NOTE — PROGRESS NOTES
"Outpatient Adult IDD Psychiatry    A HIPAA-compliant interactive audio and video telecommunication system which permits real time communications between the patient (at the originating site) and provider (at the distant site) was utilized to provide this telehealth service.     The patient, family, caregivers and guardian (as appropriate) have provided consent to conduct treatment via this telehealth service.      The patient's identity and physical location were verified at the time of this visit.     Present for appointment: Tyler and parents/guardians (Yosef and Chika).    Appointment location: Home.  [place], [place]    SUBJECTIVE    Saw PCP on 4/23/25 for annual check-up.  No treatment changes made.  Updated yearly labs still pending.    Did noticeably worse after starting trial of lurasidone after last visit, likely related to akathisia.  Dad updated me via email: Tyler has been on Latuda 40mg as discussed for 5 days now. His anxiety is fine but his mind is going faster than normal. As of the moment, we have him resting in a dark room because he exhibited pre-seizure activity, very similar to what we observed when he was having active seizures, sudden headache and some confusion. Not certain this medication change is going to be a good thing.  We resumed the ziprasidone and moved the higher dose (60mg) from morning to bedtime and he has remained on that for the past 4-6 weeks.      He has generally been \"stable\" or at his baseline since resuming the ziprasidone.  Today he is very anxious and elevated because he and dad are in the middle of their monthly \"video game day.\"  He has not had any major episodes of aggression at home towards parents.  Did try to get aggressive with provider on one occasion, but this was likely because [facility] was being too persistent with trying to get Tyler to do something he clearly was not interested in.  No noted changes in anxiety, obsessive thinking, or repetitive/scripted " questions.    Still working with independent provider ([facility]) from 09:00 to 14:00 four days a week (Emvdnd-Lwvkvoz-Jtneubnb-Friday).  No updates on any prospective day hab programs at this time.    His sleep patterns are mostly unchanged and he still naps almost daily for a few hours each morning.  He did not nap this morning because he was excited about playing video games with dad.    Weight and appetite are generally unchanged.  No other GI problems noted.      He does not appear to be having any EPS or TD.  AIMS exam is negative.  He has not had any seizures in the interim.  No changes to ASM regimen.  Saw Neurology at the end of March 2025.    Review of Systems   HENT:  Negative for drooling and trouble swallowing.    Respiratory:  Negative for shortness of breath.    Cardiovascular:  Negative for chest pain.   Gastrointestinal:  Negative for abdominal pain, constipation and vomiting.   Genitourinary:  Negative for enuresis.   Musculoskeletal:  Negative for gait problem and myalgias.   Neurological:  Negative for tremors, seizures and syncope.   Psychiatric/Behavioral:  Negative for sleep disturbance.       Controlled Substance Evaluation  OARRS/PDMP reviewed: Faisal Mcadams MD on 6/3/2025  7:49 AM  Is the patient prescribed a combination of a benzodiazepine and opioid? No  I have personally reviewed the OARRS report for Tyler Salinas.   I have considered the risks of abuse, dependence, addiction and diversion.    I believe that it is clinically appropriate for Tyler Salinas to be prescribed this medication.    Last Urine Drug Screen: Pending completion  No results found for this or any previous visit (from the past 8760 hours).    Controlled Substance Agreement: 01/02/2025  Reviewed Controlled Substance Agreement, including but not limited to:  Benefits, risks, and alternatives to treatment with a controlled substance medication(s).    Prescribed Controlled Substances:  > Benzodiazepines:  Clonazepam  What is the patient's goal of therapy? Sleep/agitation  Is this being achieved with current treatment? Somewhat  Activities of Daily Living:   Is your overall impression that this patient is benefiting (symptom reduction outweighs side effects) from benzodiazepine therapy? Yes   1. Physical Functioning: Same  2. Family Relationship: Same  3. Social Relationship: Same  4. Mood: Same  5. Sleep Patterns: Same  6. Overall Function: Same     MEDICATION HISTORY  Aripiprazole  Fluoxetine  Lurasidone - akathisia  Quetiapine  Risperidone    CURRENT MEDICATIONS  Medications Prior to Visit[1]      SOCIAL HISTORY  Living situation Lives with parents   Provider agency N/A   Work or day program None currently   School [employer] - graduated 2021   Guardianship Mother (Chika)   [facility] Specialist N/A   Nicotine None/never   Alcohol None/never   Other drugs None/never     OBJECTIVE    Lab Results   Component Value Date    HGB 16.6 03/12/2025     03/12/2025    SEGSABS 5,141 03/12/2025    GLUCOSE 93 05/20/2024     05/20/2024    K 4.5 05/20/2024    CO2 29 05/20/2024    CALCIUM 9.4 05/20/2024    CREATININE 0.85 05/20/2024    AST 38 03/12/2025    ALT 86 (H) 03/12/2025    HGBA1C 6.0 (H) 05/20/2024    TSH 1.89 05/20/2024    FREET4 1.27 05/20/2024    CHOL 188 05/20/2024    LDLCALC 117 05/20/2024    TRIG 160 (H) 05/20/2024    VITD25 30 01/29/2020     Therapeutic Drug Monitoring  10/19/2023: Lamotrigine level = 9.2 [2.5-15] (450mg/day)  08/19/2020: Lamotrigine level = 5.0 [2.5-15] (450mg/day)    Electrocardiograms  04/22/2019: Sinus tachycardia, , QTc 432    MENTAL STATUS EXAM  General/Appearance: Appropriate dress/hygiene/grooming. Wearing [employer] t-shirt.  Attitude/Behavior: Difficult to engage. Poor/limited eye contact.  Speech/Communication: Scripting. Incessant self-talk about various future dates/plans.  Motor: No PMA. Fidgets.  Gait: Not assessed at this visit.  Mood:  Anxious.  Affect: Anxious. Congruent.  Thought processes: Perseverative.  Thought content: Asks what they will be doing on June 5th of next year.  Perception: Does not appear to be experiencing or responding to hallucinations.  Sensorium/Cognition: Oriented to self and surroundings. Intellectual impairment. Minimal interest in participating. Difficulty shifting between topics.  Memory: Recent & remote recall is intact.  Insight: Minimal.  Judgment: Fair. Redirectable.     ASSESSMENT  Will continue current medications as Tyler seems to have had the best response with the ziprasidone thus far.  Suggested they could use a small dose of clonazepam (0.25mg-0.5mg) during the daytime for anxiety if needed.    PROBLEM LIST  ASD  Other specified disruptive, impulse control, and conduct disorder   Epilepsy    PLAN  -- Continue ziprasidone 40mg - 40mg - 60mg (AM, 14:00, 19:00) for aggression and impulse control  -- Continue clonidine 0.1mg - 0.1mg - 0.2mg (AM, 14:00, 19:00) for impulse control  -- Continue hydroxyzine 25mg at 14:00 and 50mg at 19:00 for anxiety  -- Continue clonazepam 0.125mg to 0.25mg at bedtime for anxiety and sleep  -- Follow up 2 months    Faisal Mcadams MD    Prep time on date of the patient encounter: 0 minutes   Time spent directly with patient/family/caregiver: 30 minutes   Additional time spent on patient care activities: 0 minutes   Documentation time: 5 minutes   Other time spent: 0 minutes   Total time on date of patient encounter: 35 minutes          [1]   Outpatient Medications Prior to Visit   Medication Sig Dispense Refill    chlorhexidine (Hibiclens) 4 % external liquid USE AS DIRECTED. 20 ml mixed into bath soak for 2 hours two times per week      clonazePAM (KlonoPIN) 1 mg tablet Take 1/4 to 1/2 tablet daily at bedtime as needed for sleep. 15 tablet 1    clonazePAM (KlonoPIN) 2 mg disintegrating tablet Take by mouth.      cloNIDine (Catapres) 0.1 mg tablet Take 1 tablet (0.1 mg) by  mouth twice daily - morning and 2pm. 180 tablet 3    cloNIDine (Catapres) 0.2 mg tablet Take 1 tablet (0.2 mg) by mouth once daily at bedtime. 90 tablet 3    fluticasone (Flonase) 50 mcg/actuation nasal spray Administer 2 sprays into affected nostril(s) once daily.      hydrOXYzine HCL (Atarax) 25 mg tablet Take 1 tablet (25 mg) by mouth at 2pm and 2 tablets (50 mg) by mouth at bedtime. 270 tablet 3    ketoconazole (NIZOral) 2 % cream Apply topically 2 times a day. 60 g 2    lamoTRIgine (LaMICtal) 25 mg chewable tablet Take 9 tablets (225 mg) by mouth 2 times a day. 540 tablet 5    multivitamin tablet Take 1 tablet by mouth once daily.      ziprasidone (Geodon) 40 mg capsule Take 1 capsule (40 mg) by mouth twice daily with food - 2pm and bedtime. 180 capsule 3     No facility-administered medications prior to visit.

## 2025-06-04 ENCOUNTER — APPOINTMENT (OUTPATIENT)
Dept: BEHAVIORAL HEALTH | Facility: CLINIC | Age: 25
End: 2025-06-04
Payer: COMMERCIAL

## 2025-06-04 DIAGNOSIS — F91.8 OTHER SPECIFIED DISRUPTIVE, IMPULSE CONTROL, AND CONDUCT DISORDER: Primary | ICD-10-CM

## 2025-06-04 DIAGNOSIS — F84.0 AUTISM SPECTRUM DISORDER (HHS-HCC): ICD-10-CM

## 2025-06-04 DIAGNOSIS — F63.89 OTHER SPECIFIED DISRUPTIVE, IMPULSE CONTROL, AND CONDUCT DISORDER: Primary | ICD-10-CM

## 2025-06-04 PROCEDURE — 1036F TOBACCO NON-USER: CPT | Performed by: PSYCHIATRY & NEUROLOGY

## 2025-06-04 PROCEDURE — 99214 OFFICE O/P EST MOD 30 MIN: CPT | Performed by: PSYCHIATRY & NEUROLOGY

## 2025-06-04 RX ORDER — ZIPRASIDONE HYDROCHLORIDE 40 MG/1
CAPSULE ORAL
Qty: 180 CAPSULE | Refills: 3 | Status: SHIPPED | OUTPATIENT
Start: 2025-06-04

## 2025-06-04 RX ORDER — CLONIDINE HYDROCHLORIDE 0.1 MG/1
TABLET ORAL
Qty: 180 TABLET | Refills: 3 | Status: SHIPPED | OUTPATIENT
Start: 2025-06-04

## 2025-06-04 RX ORDER — HYDROXYZINE HYDROCHLORIDE 25 MG/1
TABLET, FILM COATED ORAL
Qty: 270 TABLET | Refills: 3 | Status: SHIPPED | OUTPATIENT
Start: 2025-06-04

## 2025-06-04 RX ORDER — ZIPRASIDONE HYDROCHLORIDE 60 MG/1
60 CAPSULE ORAL NIGHTLY
Qty: 90 CAPSULE | Refills: 3 | Status: SHIPPED | OUTPATIENT
Start: 2025-06-04

## 2025-06-04 RX ORDER — CLONIDINE HYDROCHLORIDE 0.2 MG/1
0.2 TABLET ORAL NIGHTLY
Qty: 90 TABLET | Refills: 3 | Status: SHIPPED | OUTPATIENT
Start: 2025-06-04

## 2025-06-19 LAB
ALBUMIN SERPL-MCNC: 4.1 G/DL (ref 3.6–5.1)
ALP SERPL-CCNC: 80 U/L (ref 36–130)
ALT SERPL-CCNC: 90 U/L (ref 9–46)
ANION GAP SERPL CALCULATED.4IONS-SCNC: 9 MMOL/L (CALC) (ref 7–17)
AST SERPL-CCNC: 46 U/L (ref 10–40)
BASOPHILS # BLD AUTO: 56 CELLS/UL (ref 0–200)
BASOPHILS NFR BLD AUTO: 0.6 %
BILIRUB SERPL-MCNC: 0.6 MG/DL (ref 0.2–1.2)
BUN SERPL-MCNC: 16 MG/DL (ref 7–25)
CALCIUM SERPL-MCNC: 9.4 MG/DL (ref 8.6–10.3)
CHLORIDE SERPL-SCNC: 100 MMOL/L (ref 98–110)
CHOLEST SERPL-MCNC: 180 MG/DL
CHOLEST/HDLC SERPL: 4.4 (CALC)
CO2 SERPL-SCNC: 29 MMOL/L (ref 20–32)
CREAT SERPL-MCNC: 0.81 MG/DL (ref 0.6–1.24)
EGFRCR SERPLBLD CKD-EPI 2021: 125 ML/MIN/1.73M2
EOSINOPHIL # BLD AUTO: 288 CELLS/UL (ref 15–500)
EOSINOPHIL NFR BLD AUTO: 3.1 %
ERYTHROCYTE [DISTWIDTH] IN BLOOD BY AUTOMATED COUNT: 12.8 % (ref 11–15)
EST. AVERAGE GLUCOSE BLD GHB EST-MCNC: 146 MG/DL
EST. AVERAGE GLUCOSE BLD GHB EST-SCNC: 8.1 MMOL/L
GLUCOSE SERPL-MCNC: 151 MG/DL (ref 65–99)
HBA1C MFR BLD: 6.7 %
HCT VFR BLD AUTO: 50.7 % (ref 38.5–50)
HDLC SERPL-MCNC: 41 MG/DL
HGB BLD-MCNC: 16.2 G/DL (ref 13.2–17.1)
LDLC SERPL CALC-MCNC: 103 MG/DL (CALC)
LYMPHOCYTES # BLD AUTO: 3329 CELLS/UL (ref 850–3900)
LYMPHOCYTES NFR BLD AUTO: 35.8 %
MCH RBC QN AUTO: 28.2 PG (ref 27–33)
MCHC RBC AUTO-ENTMCNC: 32 G/DL (ref 32–36)
MCV RBC AUTO: 88.2 FL (ref 80–100)
MONOCYTES # BLD AUTO: 837 CELLS/UL (ref 200–950)
MONOCYTES NFR BLD AUTO: 9 %
NEUTROPHILS # BLD AUTO: 4790 CELLS/UL (ref 1500–7800)
NEUTROPHILS NFR BLD AUTO: 51.5 %
NONHDLC SERPL-MCNC: 139 MG/DL (CALC)
PLATELET # BLD AUTO: 269 THOUSAND/UL (ref 140–400)
PMV BLD REES-ECKER: 9.6 FL (ref 7.5–12.5)
POTASSIUM SERPL-SCNC: 4.7 MMOL/L (ref 3.5–5.3)
PROT SERPL-MCNC: 7.2 G/DL (ref 6.1–8.1)
RBC # BLD AUTO: 5.75 MILLION/UL (ref 4.2–5.8)
SODIUM SERPL-SCNC: 138 MMOL/L (ref 135–146)
T4 FREE SERPL-MCNC: 1.2 NG/DL (ref 0.8–1.8)
TRIGL SERPL-MCNC: 252 MG/DL
TSH SERPL-ACNC: 1.92 MIU/L (ref 0.4–4.5)
WBC # BLD AUTO: 9.3 THOUSAND/UL (ref 3.8–10.8)

## 2025-07-02 DIAGNOSIS — R74.8 ELEVATED LIVER ENZYMES: Primary | ICD-10-CM

## 2025-07-02 DIAGNOSIS — E11.9 TYPE 2 DIABETES MELLITUS WITHOUT COMPLICATION, UNSPECIFIED WHETHER LONG TERM INSULIN USE: ICD-10-CM

## 2025-07-02 RX ORDER — METFORMIN HYDROCHLORIDE 500 MG/1
500 TABLET ORAL
Qty: 90 TABLET | Refills: 1 | Status: SHIPPED | OUTPATIENT
Start: 2025-07-02 | End: 2025-09-30

## 2025-08-12 ASSESSMENT — ENCOUNTER SYMPTOMS
SEIZURES: 0
ABDOMINAL PAIN: 0
MYALGIAS: 0
VOMITING: 0
SLEEP DISTURBANCE: 0
TREMORS: 0
CONSTIPATION: 0
TROUBLE SWALLOWING: 0

## 2025-08-13 ENCOUNTER — APPOINTMENT (OUTPATIENT)
Dept: BEHAVIORAL HEALTH | Facility: CLINIC | Age: 25
End: 2025-08-13
Payer: COMMERCIAL

## 2025-08-13 DIAGNOSIS — F63.89 OTHER SPECIFIED DISRUPTIVE, IMPULSE CONTROL, AND CONDUCT DISORDER: Primary | ICD-10-CM

## 2025-08-13 DIAGNOSIS — F91.8 OTHER SPECIFIED DISRUPTIVE, IMPULSE CONTROL, AND CONDUCT DISORDER: Primary | ICD-10-CM

## 2025-08-13 DIAGNOSIS — F84.0 AUTISM SPECTRUM DISORDER (HHS-HCC): ICD-10-CM

## 2025-08-13 PROCEDURE — 1036F TOBACCO NON-USER: CPT | Performed by: PSYCHIATRY & NEUROLOGY

## 2025-08-13 PROCEDURE — 99214 OFFICE O/P EST MOD 30 MIN: CPT | Performed by: PSYCHIATRY & NEUROLOGY

## 2025-08-13 RX ORDER — CLONAZEPAM 1 MG/1
TABLET ORAL
Qty: 15 TABLET | Refills: 2 | Status: SHIPPED | OUTPATIENT
Start: 2025-08-13

## 2025-08-13 ASSESSMENT — ENCOUNTER SYMPTOMS: SHORTNESS OF BREATH: 1

## 2025-10-15 ENCOUNTER — APPOINTMENT (OUTPATIENT)
Dept: BEHAVIORAL HEALTH | Facility: CLINIC | Age: 25
End: 2025-10-15
Payer: COMMERCIAL

## 2025-10-22 ENCOUNTER — APPOINTMENT (OUTPATIENT)
Dept: PRIMARY CARE | Facility: CLINIC | Age: 25
End: 2025-10-22
Payer: COMMERCIAL